# Patient Record
Sex: MALE | Race: WHITE | Employment: UNEMPLOYED | URBAN - METROPOLITAN AREA
[De-identification: names, ages, dates, MRNs, and addresses within clinical notes are randomized per-mention and may not be internally consistent; named-entity substitution may affect disease eponyms.]

---

## 2017-11-14 ENCOUNTER — OFFICE VISIT (OUTPATIENT)
Dept: INTERNAL MEDICINE CLINIC | Facility: CLINIC | Age: 47
End: 2017-11-14

## 2017-11-14 VITALS
HEIGHT: 64 IN | DIASTOLIC BLOOD PRESSURE: 89 MMHG | SYSTOLIC BLOOD PRESSURE: 138 MMHG | TEMPERATURE: 99 F | BODY MASS INDEX: 32.27 KG/M2 | HEART RATE: 90 BPM | WEIGHT: 189 LBS

## 2017-11-14 DIAGNOSIS — Z72.0 TOBACCO ABUSE: ICD-10-CM

## 2017-11-14 DIAGNOSIS — J40 BRONCHITIS: Primary | ICD-10-CM

## 2017-11-14 DIAGNOSIS — H61.22 IMPACTED CERUMEN OF LEFT EAR: ICD-10-CM

## 2017-11-14 PROCEDURE — 99212 OFFICE O/P EST SF 10 MIN: CPT | Performed by: INTERNAL MEDICINE

## 2017-11-14 PROCEDURE — 99213 OFFICE O/P EST LOW 20 MIN: CPT | Performed by: INTERNAL MEDICINE

## 2017-11-14 RX ORDER — LEVOFLOXACIN 500 MG/1
500 TABLET, FILM COATED ORAL DAILY
Qty: 10 TABLET | Refills: 0 | Status: SHIPPED | OUTPATIENT
Start: 2017-11-14 | End: 2017-11-24

## 2017-11-22 NOTE — PROGRESS NOTES
HPI:    Patient ID: Tiffanie Mckeon is a 50year old male.     HPI    Cough congestion    No wheezing  Chronic everyday smoker  No chest pain or fever        /89 (BP Location: Right arm, Patient Position: Sitting, Cuff Size: adult)   Pulse 90   Temp atraumatic. Right Ear: External ear normal.   Left Ear: External ear normal.   Mouth/Throat: Oropharynx is clear and moist. No oropharyngeal exudate. Eyes: Conjunctivae are normal. Right eye exhibits no discharge. Left eye exhibits no discharge.  No scl

## 2019-11-06 ENCOUNTER — APPOINTMENT (OUTPATIENT)
Dept: GENERAL RADIOLOGY | Facility: HOSPITAL | Age: 49
DRG: 291 | End: 2019-11-06
Attending: EMERGENCY MEDICINE
Payer: COMMERCIAL

## 2019-11-06 ENCOUNTER — HOSPITAL ENCOUNTER (OUTPATIENT)
Age: 49
Discharge: OTHER INSTITUTION | End: 2019-11-06
Payer: COMMERCIAL

## 2019-11-06 ENCOUNTER — HOSPITAL ENCOUNTER (INPATIENT)
Facility: HOSPITAL | Age: 49
LOS: 1 days | Discharge: LEFT BEFORE DISCHARGE | DRG: 291 | End: 2019-11-06
Attending: EMERGENCY MEDICINE | Admitting: HOSPITALIST
Payer: COMMERCIAL

## 2019-11-06 VITALS
WEIGHT: 175 LBS | TEMPERATURE: 98 F | RESPIRATION RATE: 18 BRPM | HEART RATE: 95 BPM | BODY MASS INDEX: 29.88 KG/M2 | SYSTOLIC BLOOD PRESSURE: 156 MMHG | HEIGHT: 64 IN | DIASTOLIC BLOOD PRESSURE: 119 MMHG | OXYGEN SATURATION: 95 %

## 2019-11-06 VITALS
DIASTOLIC BLOOD PRESSURE: 127 MMHG | RESPIRATION RATE: 20 BRPM | BODY MASS INDEX: 29.88 KG/M2 | SYSTOLIC BLOOD PRESSURE: 169 MMHG | WEIGHT: 175 LBS | TEMPERATURE: 99 F | OXYGEN SATURATION: 98 % | HEART RATE: 112 BPM | HEIGHT: 64 IN

## 2019-11-06 DIAGNOSIS — I50.9 ACUTE ON CHRONIC CONGESTIVE HEART FAILURE, UNSPECIFIED HEART FAILURE TYPE (HCC): Primary | ICD-10-CM

## 2019-11-06 DIAGNOSIS — N28.9 RENAL INSUFFICIENCY: ICD-10-CM

## 2019-11-06 DIAGNOSIS — R06.00 DYSPNEA, UNSPECIFIED TYPE: Primary | ICD-10-CM

## 2019-11-06 PROCEDURE — 93010 ELECTROCARDIOGRAM REPORT: CPT | Performed by: NURSE PRACTITIONER

## 2019-11-06 PROCEDURE — 93005 ELECTROCARDIOGRAM TRACING: CPT

## 2019-11-06 PROCEDURE — 71046 X-RAY EXAM CHEST 2 VIEWS: CPT | Performed by: EMERGENCY MEDICINE

## 2019-11-06 PROCEDURE — 93010 ELECTROCARDIOGRAM REPORT: CPT

## 2019-11-06 PROCEDURE — 99223 1ST HOSP IP/OBS HIGH 75: CPT | Performed by: HOSPITALIST

## 2019-11-06 PROCEDURE — 99214 OFFICE O/P EST MOD 30 MIN: CPT

## 2019-11-06 RX ORDER — IPRATROPIUM BROMIDE AND ALBUTEROL SULFATE 2.5; .5 MG/3ML; MG/3ML
SOLUTION RESPIRATORY (INHALATION)
Status: DISCONTINUED
Start: 2019-11-06 | End: 2019-11-07

## 2019-11-06 RX ORDER — ASPIRIN 81 MG/1
324 TABLET, CHEWABLE ORAL ONCE
Status: COMPLETED | OUTPATIENT
Start: 2019-11-06 | End: 2019-11-06

## 2019-11-06 RX ORDER — FUROSEMIDE 40 MG/5ML
40 SOLUTION ORAL
Status: CANCELLED | OUTPATIENT
Start: 2019-11-07

## 2019-11-06 RX ORDER — ASPIRIN 81 MG/1
81 TABLET ORAL DAILY
Status: CANCELLED | OUTPATIENT
Start: 2019-11-06

## 2019-11-06 RX ORDER — IPRATROPIUM BROMIDE AND ALBUTEROL SULFATE 2.5; .5 MG/3ML; MG/3ML
3 SOLUTION RESPIRATORY (INHALATION) ONCE
Status: COMPLETED | OUTPATIENT
Start: 2019-11-06 | End: 2019-11-06

## 2019-11-06 RX ORDER — FUROSEMIDE 10 MG/ML
20 INJECTION INTRAMUSCULAR; INTRAVENOUS ONCE
Status: COMPLETED | OUTPATIENT
Start: 2019-11-06 | End: 2019-11-06

## 2019-11-06 RX ORDER — ATORVASTATIN CALCIUM 40 MG/1
40 TABLET, FILM COATED ORAL NIGHTLY
Status: CANCELLED | OUTPATIENT
Start: 2019-11-06

## 2019-11-07 ENCOUNTER — PATIENT OUTREACH (OUTPATIENT)
Dept: CASE MANAGEMENT | Age: 49
End: 2019-11-07

## 2019-11-07 DIAGNOSIS — I50.9 ACUTE ON CHRONIC CONGESTIVE HEART FAILURE, UNSPECIFIED HEART FAILURE TYPE (HCC): ICD-10-CM

## 2019-11-07 DIAGNOSIS — Z02.9 ENCOUNTERS FOR ADMINISTRATIVE PURPOSE: ICD-10-CM

## 2019-11-07 PROCEDURE — 1111F DSCHRG MED/CURRENT MED MERGE: CPT

## 2019-11-07 RX ORDER — FUROSEMIDE 40 MG/1
40 TABLET ORAL 2 TIMES DAILY
COMMUNITY
End: 2019-11-22

## 2019-11-07 RX ORDER — ATORVASTATIN CALCIUM 40 MG/1
40 TABLET, FILM COATED ORAL NIGHTLY
COMMUNITY
End: 2019-12-06

## 2019-11-07 RX ORDER — CARVEDILOL 6.25 MG/1
6.25 TABLET ORAL 2 TIMES DAILY WITH MEALS
COMMUNITY
End: 2019-12-06

## 2019-11-07 RX ORDER — LISINOPRIL AND HYDROCHLOROTHIAZIDE 20; 12.5 MG/1; MG/1
1 TABLET ORAL DAILY
COMMUNITY
End: 2019-11-22

## 2019-11-07 NOTE — PROGRESS NOTES
Initial Post Discharge Follow Up   Discharge Date: 11/6/19  Contact Date: 11/7/2019    Consent Verification:  Assessment Completed With: Patient  HIPAA Verified?   Yes    Discharge Dx:   CHF        General:   • How have you been since your discharge from your CHF diagnosis weighing yourself is very important  How often are you weighing yourself? I'm not  Is there any reason you are unable to weigh yourself daily? No  What was your weight yesterday? Didn't check   Today?  Didn't check  Were you told about a reviewed/discussed/and reconciled against outpatient medications with patient,  and orders reviewed and discussed. Any changes or updates to medications and or orders sent to PCP.

## 2019-11-07 NOTE — ED PROVIDER NOTES
Patient presents with:  Dyspnea CHARLIE SOB (respiratory)      HPI:     This 44year old male presents with a chief complaint of difficulty breathing that started approximately 3 to 4 days ago.   The patient states at night he wakes up out of his sleep gasping Pt will seek care thru [facility] where his Pcp is.   Alcohol use: Yes        Comment: occasionally      Drug use: Not on file      Sexual activity: Not on file    Lifestyle      Physical activity:        Days per week: Not on file        Minutes per session: Not on file      Stress: Not on file    Relation auscultation, no rhonchi or rales. Tachypneic  CARDIO: . No murmur. EXTREMITIES: no cyanosis or edema. NIETO without difficulty  GI: soft, non-tender, normal bowel sounds  NEURO: No deficits.      MDM/Assessment/Plan:   Orders for this encounter:    Or

## 2019-11-07 NOTE — ED INITIAL ASSESSMENT (HPI)
Pt sent from [facility] for productive cough + sob that began last night. Denies CP. EKG done at [facility] with NSR. HTN with no known hx.  States eh had anxiety attack prior to coming here

## 2019-11-07 NOTE — H&P
[address] [facility] Patient Status:  Emergency    1970 MRN [id number]   Location 651 [facility] Attending Desiree Lewis MD   Hosp Day # 0 PCP Fanny Pichardo MD     Date:  1 111  Resp:  [20] 20  BP: (169-174)/(127-134) 174/134    General:  Alert and oriented. Diffuse skin problem:  None. Eye:  Pupils are equal, round and reactive to light, extraocular movements are intact, Normal conjunctiva.   HENT:  Normocephalic, oral muco Assessment and Plan:    Acute congestive heart failure, likely diastolic. Patient started on Lasix 40 mg IV twice daily, monitor I's and O's and daily weights. Check 2D echo. Cardiology has been consulted.     Accelerated hypertension with hypertensi

## 2019-11-07 NOTE — PLAN OF CARE
Pt signed AMA form. IV removed. Nitro patch removed and skin cleansed. Pt verbalizes understanding of AMA form. Pt exited floor ambulatory in stable condition.

## 2019-11-07 NOTE — ED PROVIDER NOTES
Patient Seen in: [facility] AND [facility] Emergency Department    History   Patient presents with:  Dyspnea CHARLIE SOB (respiratory)    Stated Complaint: sob     HPI    45 yo M without PMH presenting for evaluation of three days of cough productive of white sputum a Expiratory wheezing with bibasilar crackles. Abdominal: Soft. Nontender. Musculoskeletal: No gross deformity. BLE without calf tenderness/edema or palpable cord. Neurological: Alert. Skin: Skin is warm. Psychiatric: Cooperative.   Nursing note and vi LAVENDER   RAINBOW DRAW LIGHT GREEN   RAINBOW DRAW GOLD      EKG    Rate, intervals and axes as noted on EKG Report.   Rate: 109   Rhythm: sinus   Reading: sinus tach 109 withotu ischemic change as interpreted by myself          Xr Chest Pa + Lat Chest (cpt with cardiology Dr. Paco Salinas consulted and recs appreciated. CXR with questionable RLL change, PCT nonacute; labs with renal insufficiency of unknown chronicity.       Disposition and Plan     Clinical Impression:  Acute on chronic congestive heart failure, u

## 2019-11-07 NOTE — CONSULTS
CARDIOLOGY CONSULT - [facility]       NAME: Meenu [facility] Street:  - MRN: [id number] - Age: 44year old - :  1970    Date of Admission: 2019  8:05 PM  Admission Diagnosis: Acute on chronic congestive hear (85.7 kg)      No intake or output data in the 24 hours ending 11/06/19 0861    Rhythm: sinus  bpm  Neuro:  Alert and oriented, no focal deficits  Neck:  No bruits  HEENT:  Symmetric, no droop  Mouth: membranes moist  Lungs: scattered basilar crackle

## 2019-11-07 NOTE — ED INITIAL ASSESSMENT (HPI)
PATIENT ARRIVED AMBULATORY TO ROOM C/O SYMPTOMS X2 DAYS. PATIENT STATES \"I'M HAVING TROUBLE BREATHING AND IM COUGHING UP WHITE FOAM\" PATIENT STATES \"I FEEL LIKE I HAVE FLUID IN MY LUNGS\" NO CHEST PAIN. NO FEVERS.

## 2019-11-08 ENCOUNTER — OFFICE VISIT (OUTPATIENT)
Dept: INTERNAL MEDICINE CLINIC | Facility: CLINIC | Age: 49
End: 2019-11-08
Payer: COMMERCIAL

## 2019-11-08 VITALS
OXYGEN SATURATION: 95 % | SYSTOLIC BLOOD PRESSURE: 117 MMHG | BODY MASS INDEX: 31.07 KG/M2 | WEIGHT: 182 LBS | DIASTOLIC BLOOD PRESSURE: 82 MMHG | HEART RATE: 80 BPM | HEIGHT: 64 IN

## 2019-11-08 DIAGNOSIS — N18.30 STAGE 3 CHRONIC KIDNEY DISEASE (HCC): ICD-10-CM

## 2019-11-08 DIAGNOSIS — I10 ESSENTIAL HYPERTENSION: ICD-10-CM

## 2019-11-08 DIAGNOSIS — Z72.0 TOBACCO ABUSE: ICD-10-CM

## 2019-11-08 DIAGNOSIS — I50.9 CONGESTIVE HEART FAILURE, UNSPECIFIED HF CHRONICITY, UNSPECIFIED HEART FAILURE TYPE (HCC): Primary | ICD-10-CM

## 2019-11-08 DIAGNOSIS — F10.10 ALCOHOL ABUSE: ICD-10-CM

## 2019-11-08 PROCEDURE — 99214 OFFICE O/P EST MOD 30 MIN: CPT | Performed by: INTERNAL MEDICINE

## 2019-11-08 PROCEDURE — 1111F DSCHRG MED/CURRENT MED MERGE: CPT | Performed by: INTERNAL MEDICINE

## 2019-11-08 NOTE — PAYOR COMM NOTE
--------------  ADMISSION REVIEW-----LEFT AMA        Payor: Anival SHEPHERD/MARKEL  Subscriber #:  [id number]  Authorization Number: [id number]    Admit date: 11/6/19  Admit time:  2259  Discharge Date: 11/6/2019 11:35 PM     Admitting Physician: Aubrey Benitez All other systems reviewed and negative except as noted above. PSFH elements reviewed from today and agreed except as otherwise stated in HPI.     Physical Exam     ED Triage Vitals   BP 11/06/19 1933 (!) 174/134   Pulse 11/06/19 1933 111   Resp 11/06/19 Neutrophil Absolute Prelim 9.30 (*)     Neutrophil Absolute 9.30 (*)     All other components within normal limits   D-DIMER - Normal   PROCALCITONIN - Normal   PROTHROMBIN TIME (PT) - Normal   PTT, ACTIVATED - Normal   CBC WITH DIFFERENTIAL WITH PLATELET CONCLUSION:   1. Findings most consistent with mild CHF/fluid overload including central pulmonary venous congestion and a suspected small right pleural effusion.  2. Focal opacity in the right lower lobe probably relates to passive atelectasis although pne History & Physical    Bennie Carrion Patient Status:  Emergency    1970 MRN [id number]   Location [address] Attending Jay Russ MD   Hosp Day # 0 PCP Kraig Ramey MD     Date:  2019  Date of Admission: BP: (169-174)/(127-134) 174/134    General:  Alert and oriented. Diffuse skin problem:  None. Eye:  Pupils are equal, round and reactive to light, extraocular movements are intact, Normal conjunctiva.   HENT:  Normocephalic, oral mucosa is moist.  Head: Acute congestive heart failure, likely diastolic. Patient started on Lasix 40 mg IV twice daily, monitor I's and O's and daily weights. Check 2D echo. Cardiology has been consulted.     Accelerated hypertension with hypertensive heart disease  Blood pres Electronically signed by Edmundo Cabot, MD on 11/6/2019 11:59 PM         Consults signed by Beth Villagomez MD at 11/6/2019 10:50 PM     Author: Beth Villagomez MD Service: Ramya Tan Author Type: Physician   Filed: 11/6/2019 10:50 PM Date of Service: 11/6/2019 1 1933 11/06/19 2115 11/06/19  2145 11/06/19  2230   BP: (!) 174/134 (!) 159/128 (!) 156/105 (!) 156/108   Pulse: 111 104 97 96   Resp: 20 18 16 18   Temp: 97.9 °F (36.6 °C)         SpO2: 100% 96% 97% 95%   Weight: 175 lb (79.4 kg)         Height: 162.6 cm

## 2019-11-08 NOTE — PROGRESS NOTES
HPI:    Patient ID: Chioma Michael is a 44year old male.     HPI     Here for follow up  Was admitted wt11/6 discharged 11/7/19  Against medical advised  Did not complete cardiac work up   Petros Munguia better  Was short of breath   CHF   Naveen williamson 18   Temp: 97.9 °F (36.6 °C)         SpO2: 100% 96% 97% 95%   Weight: 175 lb (79.4 kg)         Height: 162.6 cm (5' 4\")                  Oxygen Therapy  SpO2: 95 %  O2 Device: None (Room air)                       Wt Readings from Last 3 Encounters:  11/0 kg)  11/06/19 : 175 lb (79.4 kg)  11/14/17 : 189 lb (85.7 kg)  12/21/16 : 190 lb 3.2 oz (86.3 kg)  09/22/14 : 179 lb 8 oz (81.4 kg)    Body mass index is 31.24 kg/m².   HGBA1C:  No results found for: A1C, EAG      Review of Systems   Constitutional: Negativ Current User    Alcohol use: Yes      Comment: occasionally    Drug use: Not on file       PHYSICAL EXAM:    Physical Exam   Constitutional: He appears well-nourished. No distress. HENT:   Head: Normocephalic and atraumatic.    Right Ear: External ear nor 0.20 x10(3) uL 0.09   Immature Granulocyte Absolute      0.00 - 1.00 x10(3) uL 0.04   Neutrophils %      % 64.8   Lymphocytes %      % 26.5   Monocytes %      % 5.8   Eosinophils %      % 2.0   Basophils %      % 0.6   Immature Granulocyte %      % 0.3   G hypertension  Plan: /82 (BP Location: Right arm, Patient Position: Sitting, Cuff Size: large)   Pulse 80   Ht 5' 4\" (1.626 m)   Wt 182 lb (82.6 kg)   SpO2 95%   BMI 31.24 kg/m²   controlled    (F10.10) Alcohol abuse  Plan: alcohol rehab  Abstinence

## 2019-11-18 ENCOUNTER — TELEPHONE (OUTPATIENT)
Dept: CARDIOLOGY CLINIC | Facility: CLINIC | Age: 49
End: 2019-11-18

## 2019-11-18 ENCOUNTER — HOSPITAL ENCOUNTER (OUTPATIENT)
Dept: CV DIAGNOSTICS | Age: 49
Discharge: HOME | End: 2019-11-18
Attending: INTERNAL MEDICINE
Payer: COMMERCIAL

## 2019-11-18 DIAGNOSIS — I50.9 CONGESTIVE HEART FAILURE, UNSPECIFIED HF CHRONICITY, UNSPECIFIED HEART FAILURE TYPE (HCC): ICD-10-CM

## 2019-11-18 PROCEDURE — 93306 TTE W/DOPPLER COMPLETE: CPT | Performed by: INTERNAL MEDICINE

## 2019-11-18 NOTE — TELEPHONE ENCOUNTER
Pt here for echocardiogram. BP upon evaluation is 78/60, verified in both arms. Pt slightly dizzy at times.   Had pt drink water ,

## 2019-11-18 NOTE — TELEPHONE ENCOUNTER
Pt drank large glass of water, no c/o dizziness or lightheadedness  now. BP 86/60. Instructed to hold medication till talks with Dr. Lux Carranza.  To eat breakfast and drink more water this am. To call PCP office if doesn't hear from them by tomorrow am.

## 2019-11-18 NOTE — TELEPHONE ENCOUNTER
Patient has office visit with Dr. Ariana Romero 11/22. Patient informed by RN in cardiology to hold all of his medications except for his statin until further advice from Dr. Ariana Romero. Please advise.

## 2019-11-18 NOTE — TELEPHONE ENCOUNTER
I agree  Hold off all meds    See me this week if he does not have an appt with cardiology add to SDS

## 2019-11-22 ENCOUNTER — OFFICE VISIT (OUTPATIENT)
Dept: INTERNAL MEDICINE CLINIC | Facility: CLINIC | Age: 49
End: 2019-11-22
Payer: COMMERCIAL

## 2019-11-22 ENCOUNTER — APPOINTMENT (OUTPATIENT)
Dept: LAB | Age: 49
End: 2019-11-22
Attending: INTERNAL MEDICINE
Payer: COMMERCIAL

## 2019-11-22 VITALS
SYSTOLIC BLOOD PRESSURE: 86 MMHG | HEIGHT: 64 IN | BODY MASS INDEX: 29.71 KG/M2 | WEIGHT: 174 LBS | DIASTOLIC BLOOD PRESSURE: 54 MMHG | HEART RATE: 76 BPM

## 2019-11-22 DIAGNOSIS — I50.9 CONGESTIVE HEART FAILURE, UNSPECIFIED HF CHRONICITY, UNSPECIFIED HEART FAILURE TYPE (HCC): ICD-10-CM

## 2019-11-22 DIAGNOSIS — I10 ESSENTIAL HYPERTENSION: Primary | ICD-10-CM

## 2019-11-22 DIAGNOSIS — R73.01 ABNORMAL FASTING GLUCOSE: ICD-10-CM

## 2019-11-22 DIAGNOSIS — F10.10 ALCOHOL ABUSE: ICD-10-CM

## 2019-11-22 DIAGNOSIS — E78.5 HYPERLIPIDEMIA, UNSPECIFIED HYPERLIPIDEMIA TYPE: ICD-10-CM

## 2019-11-22 DIAGNOSIS — Z72.0 TOBACCO ABUSE: ICD-10-CM

## 2019-11-22 DIAGNOSIS — N18.30 STAGE 3 CHRONIC KIDNEY DISEASE (HCC): ICD-10-CM

## 2019-11-22 PROCEDURE — 80061 LIPID PANEL: CPT

## 2019-11-22 PROCEDURE — 99214 OFFICE O/P EST MOD 30 MIN: CPT | Performed by: INTERNAL MEDICINE

## 2019-11-22 PROCEDURE — 84439 ASSAY OF FREE THYROXINE: CPT

## 2019-11-22 PROCEDURE — 83036 HEMOGLOBIN GLYCOSYLATED A1C: CPT

## 2019-11-22 PROCEDURE — 85027 COMPLETE CBC AUTOMATED: CPT

## 2019-11-22 PROCEDURE — 84443 ASSAY THYROID STIM HORMONE: CPT

## 2019-11-22 PROCEDURE — 80053 COMPREHEN METABOLIC PANEL: CPT

## 2019-11-22 PROCEDURE — 83880 ASSAY OF NATRIURETIC PEPTIDE: CPT

## 2019-11-22 PROCEDURE — 36415 COLL VENOUS BLD VENIPUNCTURE: CPT

## 2019-11-22 RX ORDER — LISINOPRIL 20 MG/1
20 TABLET ORAL DAILY
Qty: 90 TABLET | Refills: 1 | Status: ON HOLD | OUTPATIENT
Start: 2019-11-22 | End: 2019-11-24

## 2019-11-22 RX ORDER — FUROSEMIDE 20 MG/1
20 TABLET ORAL DAILY
Qty: 90 TABLET | Refills: 1 | Status: ON HOLD | OUTPATIENT
Start: 2019-11-22 | End: 2019-11-24

## 2019-11-22 NOTE — PROGRESS NOTES
HPI:    Patient ID: Olivia Leija is a 44year old male.     HPI    Pt here for follow up  44year old with hx of alcohol abuse  Stopped drinking alcohol 3 wks ago and tobacco abuse  Smoking much less cigaretted  He was admitted in the hospital with CHF Refill   • furosemide 20 MG Oral Tab Take 1 tablet (20 mg total) by mouth daily. 90 tablet 1   • lisinopril 20 MG Oral Tab Take 1 tablet (20 mg total) by mouth daily. 90 tablet 1   • ASPIRIN 81 OR Take by mouth.      • atorvastatin 40 MG Oral Tab Take 40 mg Study Conclusions  1. Left ventricle: The cavity size was dilated. Systolic function     was normal. The estimated ejection fraction was 50-55%. Wall     motion was normal; there were no regional wall motion     abnormalities.  Doppler parameters are cons 11/22/2019 11/22/2019          11:05 AM 11:05 AM   Glucose      70 - 99 mg/dL 105 (H)    Sodium      136 - 145 mmol/L 132 (L)    Potassium      3.5 - 5.1 mmol/L 5.1    Chloride      98 - 112 mmol/L 97 (L)    Carbon Dioxide, Total      21.0 - 32.0 mmol/L 30 CBC, Platelet;  No Differential      Comp Metabolic Panel (14)      Hemoglobin A1C      Urine Microscopic w Reflex CULTURE      Meds This Visit:  Requested Prescriptions     Signed Prescriptions Disp Refills   • furosemide 20 MG Oral Tab 90 tablet 1     S

## 2019-11-23 ENCOUNTER — APPOINTMENT (OUTPATIENT)
Dept: ULTRASOUND IMAGING | Facility: HOSPITAL | Age: 49
DRG: 683 | End: 2019-11-23
Attending: EMERGENCY MEDICINE
Payer: COMMERCIAL

## 2019-11-23 ENCOUNTER — HOSPITAL ENCOUNTER (INPATIENT)
Facility: HOSPITAL | Age: 49
LOS: 1 days | Discharge: HOME | DRG: 683 | End: 2019-11-24
Attending: EMERGENCY MEDICINE | Admitting: HOSPITALIST
Payer: COMMERCIAL

## 2019-11-23 ENCOUNTER — TELEPHONE (OUTPATIENT)
Dept: INTERNAL MEDICINE CLINIC | Facility: CLINIC | Age: 49
End: 2019-11-23

## 2019-11-23 ENCOUNTER — APPOINTMENT (OUTPATIENT)
Dept: CV DIAGNOSTICS | Facility: HOSPITAL | Age: 49
DRG: 683 | End: 2019-11-23
Attending: INTERNAL MEDICINE
Payer: COMMERCIAL

## 2019-11-23 DIAGNOSIS — N17.9 AKI (ACUTE KIDNEY INJURY) (HCC): Primary | ICD-10-CM

## 2019-11-23 PROCEDURE — 99223 1ST HOSP IP/OBS HIGH 75: CPT | Performed by: HOSPITALIST

## 2019-11-23 PROCEDURE — 99255 IP/OBS CONSLTJ NEW/EST HI 80: CPT | Performed by: INTERNAL MEDICINE

## 2019-11-23 PROCEDURE — 76770 US EXAM ABDO BACK WALL COMP: CPT | Performed by: EMERGENCY MEDICINE

## 2019-11-23 RX ORDER — HEPARIN SODIUM 5000 [USP'U]/ML
5000 INJECTION, SOLUTION INTRAVENOUS; SUBCUTANEOUS EVERY 12 HOURS SCHEDULED
Status: DISCONTINUED | OUTPATIENT
Start: 2019-11-23 | End: 2019-11-24

## 2019-11-23 RX ORDER — ZOLPIDEM TARTRATE 5 MG/1
5 TABLET ORAL NIGHTLY PRN
Status: DISCONTINUED | OUTPATIENT
Start: 2019-11-23 | End: 2019-11-24

## 2019-11-23 RX ORDER — ACETAMINOPHEN 325 MG/1
650 TABLET ORAL EVERY 6 HOURS PRN
Status: DISCONTINUED | OUTPATIENT
Start: 2019-11-23 | End: 2019-11-24

## 2019-11-23 RX ORDER — SODIUM CHLORIDE 9 MG/ML
INJECTION, SOLUTION INTRAVENOUS CONTINUOUS
Status: DISCONTINUED | OUTPATIENT
Start: 2019-11-23 | End: 2019-11-24

## 2019-11-23 RX ORDER — ATORVASTATIN CALCIUM 40 MG/1
40 TABLET, FILM COATED ORAL NIGHTLY
Status: DISCONTINUED | OUTPATIENT
Start: 2019-11-23 | End: 2019-11-23

## 2019-11-23 RX ORDER — HYDRALAZINE HYDROCHLORIDE 20 MG/ML
10 INJECTION INTRAMUSCULAR; INTRAVENOUS EVERY 4 HOURS PRN
Status: DISCONTINUED | OUTPATIENT
Start: 2019-11-23 | End: 2019-11-24

## 2019-11-23 RX ORDER — CARVEDILOL 6.25 MG/1
6.25 TABLET ORAL 2 TIMES DAILY WITH MEALS
Status: DISCONTINUED | OUTPATIENT
Start: 2019-11-23 | End: 2019-11-24

## 2019-11-23 RX ORDER — FUROSEMIDE 40 MG/5ML
40 SOLUTION ORAL
Status: DISCONTINUED | OUTPATIENT
Start: 2019-11-23 | End: 2019-11-23

## 2019-11-23 RX ORDER — ATORVASTATIN CALCIUM 40 MG/1
40 TABLET, FILM COATED ORAL NIGHTLY
Status: DISCONTINUED | OUTPATIENT
Start: 2019-11-23 | End: 2019-11-24

## 2019-11-23 RX ORDER — ONDANSETRON 2 MG/ML
4 INJECTION INTRAMUSCULAR; INTRAVENOUS EVERY 6 HOURS PRN
Status: DISCONTINUED | OUTPATIENT
Start: 2019-11-23 | End: 2019-11-24

## 2019-11-23 RX ORDER — ASPIRIN 81 MG/1
81 TABLET ORAL DAILY
Status: DISCONTINUED | OUTPATIENT
Start: 2019-11-23 | End: 2019-11-24

## 2019-11-23 RX ORDER — FUROSEMIDE 40 MG/1
40 TABLET ORAL
Status: DISCONTINUED | OUTPATIENT
Start: 2019-11-23 | End: 2019-11-23

## 2019-11-23 RX ORDER — ASPIRIN 81 MG/1
81 TABLET ORAL DAILY
Status: DISCONTINUED | OUTPATIENT
Start: 2019-11-23 | End: 2019-11-23

## 2019-11-23 NOTE — CONSULTS
[facility]    PATIENT'S NAME: Seth Montejo   ATTENDING PHYSICIAN: Mickey Kennedy MD   CONSULTING PHYSICIAN: Radha Brasher MD   PATIENT ACCOUNT#:   [id number]    LOCATION:  [address] #:   [id number]       DATE OF B time of admission showed that he was on furosemide 20 mg daily, lisinopril 20 mg daily, aspirin 81 mg daily, Lipitor 40 mg at bedtime, and Coreg 6.25 mg b.i.d. ALLERGIES:  There are no known allergies.     SOCIAL HISTORY:  The patient has smoked a pack hydration. Will follow with I and O's, daily weights, and serial chemistries. Strongly advised the patient to stay here in the hospital until he is medically stable. Reinforced the importance of avoiding all use of nonsteroidals.   He will continue work

## 2019-11-23 NOTE — ED INITIAL ASSESSMENT (HPI)
Pt sent by MD for acute renal failure. Recently was admitted for acute CHF. Pt states he has been having difficulty urinating. Denies CHARLIE, no swelling noted.

## 2019-11-23 NOTE — ED NOTES
Orders for admission, patient is aware of plan and ready to go upstairs.  Any questions, please call ED RN Henny Slade  at extension [phone]

## 2019-11-23 NOTE — PLAN OF CARE
Patient had Echo done on 11/18/19. Another Echo ordered today. Checked with Froy ESTRADA. No Echo needed at this time.

## 2019-11-23 NOTE — H&P
[facility] Patient Status:  Emergency    1970 MRN [id number]   Location 651 [facility] Attending Sejal Frederick MD   Hosp Day # 0 PCP Chris Herr MD     Date:  6 Facility-Administered Medications: None       Review of Systems:  Constitutional:  Weakness, Fatigue. Eye:  Negative. Ear/Nose/Mouth/Throat:  Negative. Respiratory:  Negative  Cardiovascular: Negative  Gastrointestinal:  Negative.   Genitourinary:  Neg Plan:    Acute kidney injury  Patient recently started on diuretics along with ACE inhibitor, likely prerenal with possible component of ATN, FENa and renal ultrasound pending, patient started on gentle hydration will continue the same.   Avoid nephrotoxic

## 2019-11-23 NOTE — PLAN OF CARE
Problem: CARDIOVASCULAR - ADULT  Goal: Maintains optimal cardiac output and hemodynamic stability  Description  INTERVENTIONS:  - Monitor vital signs, rhythm, and trends  - Monitor for bleeding, hypotension and signs of decreased cardiac output  - Evalua Plan goals for specific interventions  Outcome: Progressing     Admitted pt to floor. Aox4. Started IVF.  VSS. No issues with assessment. Pt states he just wants to sleep. Educated on [address] [facility] [address].

## 2019-11-23 NOTE — ED PROVIDER NOTES
Patient Seen in: Merged with [facility] Emergency Department      History   Patient presents with:  Abnormal Result (metabolic, cardiac)    Stated Complaint: Renal failure, sent in by Dr. Darrick Sher    HPI    61-year-old male with history of CHF here with compla systems reviewed and are negative. Positive for stated complaint: Renal failure, sent in by Dr. Israel Cowan  Other systems are as noted in HPI. Constitutional and vital signs reviewed. All other systems reviewed and negative except as noted above. oxygenation.     PROCEDURES:  none    DIAGNOSTICS:   Labs:  Recent Results (from the past 24 hour(s))   PRO BETA NATRIURETIC PEPTIDE    Collection Time: 11/22/19 11:05 AM   Result Value Ref Range    Pro-Beta Natriuretic Peptide 476 (H) <125 pg/mL   ASSAY, T g/dL    Globulin  4.6 (H) 2.8 - 4.4 g/dL    A/G Ratio 0.8 (L) 1.0 - 2.0    FASTING Yes    HEMOGLOBIN A1C    Collection Time: 11/22/19 11:05 AM   Result Value Ref Range    HgbA1C 5.6 <5.7 %    Estimated Average Glucose 114 68 - 126 mg/dL   BASIC METABOLIC P vitals  - afebrile, hemodynamically stable  - I ordered and personally reviewed the labs and imaging and found leukocytosis, no anemia, hyponatremia, JANKI,   - post void residual without significant urinary retention  - discussed with Dr. Jahaira Mosley - informed hi 2:18 am    Follow-up:  No follow-up provider specified.       Medications Prescribed:  Current Discharge Medication List                   Present on Admission  Date Reviewed: 11/23/2019          ICD-10-CM Noted POA    [facility]ANKI [facility]) ([facility] N17.9

## 2019-11-24 ENCOUNTER — APPOINTMENT (OUTPATIENT)
Dept: GENERAL RADIOLOGY | Facility: HOSPITAL | Age: 49
DRG: 683 | End: 2019-11-24
Attending: INTERNAL MEDICINE
Payer: COMMERCIAL

## 2019-11-24 ENCOUNTER — TELEPHONE (OUTPATIENT)
Dept: NEPHROLOGY | Facility: CLINIC | Age: 49
End: 2019-11-24

## 2019-11-24 VITALS
OXYGEN SATURATION: 97 % | WEIGHT: 176.38 LBS | DIASTOLIC BLOOD PRESSURE: 76 MMHG | SYSTOLIC BLOOD PRESSURE: 122 MMHG | RESPIRATION RATE: 18 BRPM | HEART RATE: 69 BPM | BODY MASS INDEX: 30 KG/M2 | TEMPERATURE: 98 F

## 2019-11-24 PROCEDURE — 99239 HOSP IP/OBS DSCHRG MGMT >30: CPT | Performed by: HOSPITALIST

## 2019-11-24 PROCEDURE — 99233 SBSQ HOSP IP/OBS HIGH 50: CPT | Performed by: INTERNAL MEDICINE

## 2019-11-24 PROCEDURE — 71045 X-RAY EXAM CHEST 1 VIEW: CPT | Performed by: INTERNAL MEDICINE

## 2019-11-24 NOTE — DISCHARGE SUMMARY
[facility]ist Discharge Summary   Patient ID:  Scar Rosenberg  [id number]  8 year old  7/9/1970    Admit date: 11/23/2019  Discharge date: 11/24/2019  Primary Care Physician: Patricia Ferreira MD   Attending Physician: Ritesh Bloom MD   [facility] statin     Tobacco abuse  - Patient counseled encouraged to quit.     EXAM:   GENERAL: no apparent distress, comfortable  NEURO: A/A Ox3, no focal deficits  RESP: non labored, CTAB/L  CARDIO: Regular, no murmur  ABD: soft, NT, ND  EXTREMITIES: no edema, no

## 2019-11-24 NOTE — PLAN OF CARE
Problem: CARDIOVASCULAR - ADULT  Goal: Maintains optimal cardiac output and hemodynamic stability  Description  INTERVENTIONS:  - Monitor vital signs, rhythm, and trends  - Monitor for bleeding, hypotension and signs of decreased cardiac output  - Evalua Put sign on door to notify RN before entering. Explained POC for night. Bundled care. - See additional Care Plan goals for specific interventions  Outcome: Progressing     Pt AOx4. Self care. VSS. IVF running.   Plan for CXR in am.  Deven patch on R arm

## 2019-11-24 NOTE — PROGRESS NOTES
[facility]  Nephrology Daily Progress Note    Uzma Hoyt  [id number]  44year old      HPI:   Uzma Hoyt is a 44year old male. Did not sleep well last night but otherwise feeling well. . No SOB.        ROS:     Constitutional: motor skills appropriate for age    Labs:  Lab Results   Component Value Date    WBC 14.7 11/24/2019    HGB 14.7 11/24/2019    HCT 42.7 11/24/2019    .0 11/24/2019    CREATSERUM 2.97 11/24/2019    BUN 60 11/24/2019     11/24/2019    K 4.4 11/2 Units, 5,000 Units, Subcutaneous, 2 times per day  •  hydrALAzine HCl (APRESOLINE) injection 10 mg, 10 mg, Intravenous, Q4H PRN  •  Zolpidem Tartrate (AMBIEN) tab 5 mg, 5 mg, Oral, Nightly PRN  •  nicotine (NICODERM CQ) 7 MG/24HR 1 patch, 1 patch, Transder

## 2019-11-25 ENCOUNTER — PATIENT OUTREACH (OUTPATIENT)
Dept: CASE MANAGEMENT | Age: 49
End: 2019-11-25

## 2019-11-25 DIAGNOSIS — Z02.9 ENCOUNTERS FOR ADMINISTRATIVE PURPOSE: ICD-10-CM

## 2019-11-25 NOTE — PROGRESS NOTES
NCM attempted to contact the patient for hospital follow up. Unable to leave message due to the mailbox being full. NCM will try again later.

## 2019-11-25 NOTE — PAYOR COMM NOTE
--------------  ADMISSION REVIEW     Payor: Donna SHEPHERD/MARKEL  Subscriber #:  [id number]  Authorization Number: [id number]    Admit date: 11/23/19  Admit time: 0717  DISCHARGE 11/24         Admitting Physician: Ping Cronin MD  Attending Physician:  Ariadne Hernandez Types: Cigarettes      Smokeless tobacco: Current User    Alcohol use: Yes      Comment: occasionally    Drug use: Not on file             Review of Systems   Constitutional: Negative for appetite change, fatigue and fever.    HENT: Negative for congestion, Breath sounds: Normal breath sounds. No stridor. No wheezing or rales. Abdominal:      General: There is no distension. Palpations: Abdomen is soft. Tenderness: There is no tenderness. There is no guarding.    Musculoskeletal: Normal range Collection Time: 11/22/19 11:05 AM   Result Value Ref Range    Glucose 105 (H) 70 - 99 mg/dL    Sodium 132 (L) 136 - 145 mmol/L    Potassium 5.1 3.5 - 5.1 mmol/L    Chloride 97 (L) 98 - 112 mmol/L    CO2 30.0 21.0 - 32.0 mmol/L    Anion Gap 5 0 - 18 mmol/L (3) uL    Neutrophil Absolute 9.61 (H) 1.50 - 7.70 x10(3) uL    Lymphocyte Absolute 2.71 1.00 - 4.00 x10(3) uL    Monocyte Absolute 1.09 (H) 0.10 - 1.00 x10(3) uL    Eosinophil Absolute 0.11 0.00 - 0.70 x10(3) uL    Basophil Absolute 0.05 0.00 - 0.20 x10(3 DEPARTMENT MEDICAL DECISION MAKING:  After obtaining the patient's history, performing the physical exam and reviewing the diagnostics, multiple initial diagnoses were considered based on the presenting problem including JANKI, urinary retention, dehydration which she had been taking regularly along with limiting his fluid intake was seen by his primary care physician as a follow-up checkup when blood work done indicated marked worsening of renal function from a baseline creatinine of 1.7 to now 5.2.   Patient oriented. Diffuse skin problem:  None. Eye:  Pupils are equal, round and reactive to light, extraocular movements are intact, Normal conjunctiva. HENT:  Normocephalic, oral mucosa is moist.  Head:  Normocephalic, atraumatic.   Neck:  Supple, non-tender, statin    Tobacco abuse  Patient counseled encouraged to quit.     Prophylaxis  Subcutaneous heparin    CODE STATUS  Full    Primary care physician  Sarai Oliver MD    Disposition  Clinical course will dictate outcome      Tito Lindsay MD  11/23/2019 creatinine were 89 and 5.09 respectively, and the patient is now admitted for further workup and evaluation.      PAST MEDICAL HISTORY:  Again, the patient's past medical history prior to this brief hospitalization in early November 2019 was unremarkable. unremarkable.      IMPRESSION:    1. The patient is a 57-year-old male who may actually have underlying chronic kidney disease stage III. We do not have any older laboratory studies, but his creatinine was 1.7 back on November 6, 2019.   He has now d with a past medical history significant for recently diagnosed congestive heart failure was started on diuretics and ACE inhibitor which she had been taking regularly along with limiting his fluid intake was seen by his primary care physician as a follow-u 11/24/2019 at 7:45     Approved by (CST): Edwige Rachel MD on 11/24/2019 at 7:46              Discharge Instructions      Medication List       CONTINUE taking these medications    ASPIRIN 81 OR      atorvastatin 40 MG Tabs  Commonly known as:  LIPITOR

## 2019-11-26 ENCOUNTER — TELEPHONE (OUTPATIENT)
Dept: NEPHROLOGY | Facility: CLINIC | Age: 49
End: 2019-11-26

## 2019-11-26 DIAGNOSIS — N28.9 RENAL INSUFFICIENCY: Primary | ICD-10-CM

## 2019-11-26 NOTE — DIETARY NOTE
NUTRITION EDUCATION NOTE    Received RN request to call patient at home. RN provided patient with renal nutrition guidelines upon discharge on 11/24/19.   This RD made 2 attempts to call patient, on 11/25/19 at 1159 am and today at 959 am.  Call went to vo

## 2019-11-26 NOTE — TELEPHONE ENCOUNTER
Orders entered in [address] [place] [address] Rd as [address] Appointment has been scheduled for Friday 12/6/19 4:20 pm (same day as Dr. Pedroza Nurse appt 2:40 pm) Patient requested this date/time.

## 2019-11-26 NOTE — PROGRESS NOTES
NCM attempted to contact the patient for hospital follow up. Unable to leave a message due to the MB being full. STEPHANIE will try again later.

## 2019-12-06 ENCOUNTER — OFFICE VISIT (OUTPATIENT)
Dept: INTERNAL MEDICINE CLINIC | Facility: CLINIC | Age: 49
End: 2019-12-06
Payer: COMMERCIAL

## 2019-12-06 ENCOUNTER — OFFICE VISIT (OUTPATIENT)
Dept: NEPHROLOGY | Facility: CLINIC | Age: 49
End: 2019-12-06
Payer: COMMERCIAL

## 2019-12-06 ENCOUNTER — LAB ENCOUNTER (OUTPATIENT)
Dept: LAB | Age: 49
End: 2019-12-06
Attending: INTERNAL MEDICINE
Payer: COMMERCIAL

## 2019-12-06 VITALS
BODY MASS INDEX: 30.05 KG/M2 | TEMPERATURE: 98 F | HEIGHT: 64 IN | SYSTOLIC BLOOD PRESSURE: 152 MMHG | DIASTOLIC BLOOD PRESSURE: 91 MMHG | WEIGHT: 176 LBS | HEART RATE: 80 BPM

## 2019-12-06 VITALS
DIASTOLIC BLOOD PRESSURE: 87 MMHG | BODY MASS INDEX: 30.22 KG/M2 | WEIGHT: 177 LBS | HEIGHT: 64 IN | SYSTOLIC BLOOD PRESSURE: 137 MMHG | HEART RATE: 85 BPM

## 2019-12-06 DIAGNOSIS — I50.9 CONGESTIVE HEART FAILURE, UNSPECIFIED HF CHRONICITY, UNSPECIFIED HEART FAILURE TYPE (HCC): ICD-10-CM

## 2019-12-06 DIAGNOSIS — I10 ESSENTIAL HYPERTENSION: Primary | ICD-10-CM

## 2019-12-06 DIAGNOSIS — E78.5 HYPERLIPIDEMIA, UNSPECIFIED HYPERLIPIDEMIA TYPE: ICD-10-CM

## 2019-12-06 DIAGNOSIS — N17.9 AKI (ACUTE KIDNEY INJURY) (HCC): ICD-10-CM

## 2019-12-06 DIAGNOSIS — N17.9 AKI (ACUTE KIDNEY INJURY) (HCC): Primary | ICD-10-CM

## 2019-12-06 DIAGNOSIS — Z72.0 TOBACCO ABUSE: ICD-10-CM

## 2019-12-06 DIAGNOSIS — R73.01 ABNORMAL FASTING GLUCOSE: ICD-10-CM

## 2019-12-06 DIAGNOSIS — F10.10 ALCOHOL ABUSE: ICD-10-CM

## 2019-12-06 DIAGNOSIS — N28.9 RENAL INSUFFICIENCY: ICD-10-CM

## 2019-12-06 PROCEDURE — 99214 OFFICE O/P EST MOD 30 MIN: CPT | Performed by: INTERNAL MEDICINE

## 2019-12-06 PROCEDURE — 85025 COMPLETE CBC W/AUTO DIFF WBC: CPT

## 2019-12-06 PROCEDURE — 80069 RENAL FUNCTION PANEL: CPT

## 2019-12-06 PROCEDURE — 36415 COLL VENOUS BLD VENIPUNCTURE: CPT

## 2019-12-06 PROCEDURE — 1111F DSCHRG MED/CURRENT MED MERGE: CPT | Performed by: INTERNAL MEDICINE

## 2019-12-06 RX ORDER — ATORVASTATIN CALCIUM 40 MG/1
40 TABLET, FILM COATED ORAL NIGHTLY
Qty: 90 TABLET | Refills: 3 | Status: SHIPPED | OUTPATIENT
Start: 2019-12-06 | End: 2021-01-11

## 2019-12-06 RX ORDER — ASPIRIN 81 MG/1
81 TABLET ORAL DAILY
Qty: 90 TABLET | Refills: 3 | Status: SHIPPED | OUTPATIENT
Start: 2019-12-06

## 2019-12-06 RX ORDER — CARVEDILOL 6.25 MG/1
6.25 TABLET ORAL 2 TIMES DAILY WITH MEALS
Qty: 180 TABLET | Refills: 3 | Status: SHIPPED | OUTPATIENT
Start: 2019-12-06 | End: 2019-12-06

## 2019-12-06 RX ORDER — CARVEDILOL 12.5 MG/1
6.25 TABLET ORAL 2 TIMES DAILY WITH MEALS
Qty: 180 TABLET | Refills: 1 | Status: SHIPPED | OUTPATIENT
Start: 2019-12-06 | End: 2020-04-20

## 2019-12-06 NOTE — PATIENT INSTRUCTIONS
Increase Frankford Cherelle to 12.5 mg twice daily. When you get your blood pressure cuff check your blood pressures and heart rates daily and call me in 1 week. Repeat your kidney blood test in early January 2020. Orders are in the computer.

## 2019-12-06 NOTE — PROGRESS NOTES
12/06/19        Patient: Luis Armenta   YOB: 1970   Date of Visit: 12/6/2019       Dear  Dr. Treasure Strong MD,      Thank you for referring Luis Armenta to my practice. Please find my assessment and plan below.       As you know he is a no edema. I reviewed his most recent laboratory studies which were done today on December 6, 2019. Creatinine continues to improve down to 1.95 with an estimated GFR 39 cc/min.   Electrolytes were normal.    I therefore reassured the patient that his re

## 2019-12-09 NOTE — PROGRESS NOTES
Several attempts made to reach the patient with no return call. Patient completed HFU on 12/6/2019. Closing encounter.

## 2019-12-16 NOTE — PROGRESS NOTES
HPI:    Elisa Julian is a 44year old male here today for hospital follow up.    He was discharged from Inpatient hospital, [facility]  to Home   Admission Date: 11/23/19   Discharge Date: 11/24/19  Hospital Discharge Diagnoses (since 11/16/2019 OR      atorvastatin 40 MG Tabs  Commonly known as:  LIPITOR      carvedilol 6.25 MG Tabs  Commonly known as:  COREG          STOP taking these medications    furosemide 20 MG Tabs  Commonly known as:  LASIX      lisinopril 20 MG Tabs                Activi (congestive heart failure) ([facility] [address] He  has no past surgical history on file. He family history includes Hypertension in his mother. He  reports that he has been smoking cigarettes. He has been smoking about 0.50 packs per day.  He uses smokeless toba to light. Conjunctivae and EOM are normal. Right eye exhibits no discharge. Left eye exhibits no discharge. No scleral icterus. Neck: Neck supple. No thyromegaly present. Cardiovascular: Regular rhythm, normal heart sounds and intact distal pulses. discussed  Modification of risk for CAD advised    Dietary an lifestyle change  Pt voiced understanding and agrees with plan  Pt given time to ask questions  After Visit Summary handout    Discussed  And given to patient.         Meds & Refills for this [facility]

## 2019-12-26 ENCOUNTER — OFFICE VISIT (OUTPATIENT)
Dept: CARDIOLOGY CLINIC | Facility: CLINIC | Age: 49
End: 2019-12-26
Payer: COMMERCIAL

## 2019-12-26 VITALS
WEIGHT: 175.5 LBS | RESPIRATION RATE: 18 BRPM | BODY MASS INDEX: 29.96 KG/M2 | TEMPERATURE: 99 F | HEART RATE: 67 BPM | HEIGHT: 64 IN | DIASTOLIC BLOOD PRESSURE: 88 MMHG | SYSTOLIC BLOOD PRESSURE: 146 MMHG

## 2019-12-26 DIAGNOSIS — I50.32 CHRONIC DIASTOLIC HEART FAILURE (HCC): Primary | ICD-10-CM

## 2019-12-26 DIAGNOSIS — E78.49 OTHER HYPERLIPIDEMIA: ICD-10-CM

## 2019-12-26 DIAGNOSIS — I10 ESSENTIAL HYPERTENSION: ICD-10-CM

## 2019-12-26 PROCEDURE — 99215 OFFICE O/P EST HI 40 MIN: CPT | Performed by: INTERNAL MEDICINE

## 2019-12-26 NOTE — PATIENT INSTRUCTIONS
Increase carvedilol to 18.75 mg twice a day. May use 1-1/2 of the 12.5 mg tablets twice a day. Monitor and record resting blood pressure and pulse after 5 minutes of rest for 2 weeks and call with results.   When check blood pressure check 3 readings an

## 2019-12-26 NOTE — PROGRESS NOTES
[address] [place] [address] NOTE    Robby Gowers is a 44year old male. Patient presents with:  Consult: Pt present for CHF and HTN. Pt states he has been well.      HPI:   This is a pleasant 44year old male with hypertension tobacco use and elevated cho Types: Cigarettes      Smokeless tobacco: Current User    Alcohol use: Yes      Comment: occasionally    Drug use: Never    Family History  Family History   Problem Relation Age of Onset   • Hypertension Mother         REVIEW OF SYSTEMS:   GENERAL HEALT out coronary ischemia contributing with risk factors. Patient should not do a regular stress test or echo due to his echo findings and baseline EKG. His cholesterol was at goal.  Will monitor pressures with changes.   He understands importance of watching

## 2020-03-04 NOTE — PROGRESS NOTES
STEPHANIE s/w patient who stated that he was at [address] up his prescriptions. He states he will call [facility] back as soon as he is done. NC provided contact information. The form was sent to the Physician's Nurse MSG Pool via In-Basket for review and signature.

## 2020-04-20 ENCOUNTER — TELEPHONE (OUTPATIENT)
Dept: INTERNAL MEDICINE CLINIC | Facility: CLINIC | Age: 50
End: 2020-04-20

## 2020-04-20 ENCOUNTER — TELEPHONE (OUTPATIENT)
Dept: NEPHROLOGY | Facility: CLINIC | Age: 50
End: 2020-04-20

## 2020-04-20 RX ORDER — CARVEDILOL 12.5 MG/1
18.75 TABLET ORAL 2 TIMES DAILY WITH MEALS
Qty: 270 TABLET | Refills: 1 | Status: CANCELLED | OUTPATIENT
Start: 2020-04-20

## 2020-04-20 NOTE — TELEPHONE ENCOUNTER
Per LOV note 12/6/19:  Increase [place] Cherelle to 12.5 mg twice daily. When you get your blood pressure cuff check your blood pressures and heart rates daily and call me in 1 week. Repeat your kidney blood test in early January 2020.   Orders are in the comp

## 2020-04-20 NOTE — TELEPHONE ENCOUNTER
Dose was raised on rx - he is out of rx and it is too soon to get refill per insurance     Current Outpatient Medications   Medication Sig Dispense Refill   •    3   •    3   • carvedilol 12.5 MG Oral Tab Take 0.5 tablets (6.25 mg total) by mouth 2 (two) t

## 2020-04-20 NOTE — TELEPHONE ENCOUNTER
Blood pressure still too high. Increase Coreg to 25 mg, 1 twice daily, #60, refills 2. Check blood pressures and heart rates daily and call in 2 weeks. Also do follow-up labs as ordered now.

## 2020-04-20 NOTE — TELEPHONE ENCOUNTER
Pharmacy message:  Per pt, he is supposed to be taking 18.75 mg dose. Please verify and send in new Rx with corrected directions. Along with strength, directions, quantity and refills.         Current Outpatient Medications:   •  carvedilol 12.5 MG Oral Ta

## 2020-04-21 RX ORDER — CARVEDILOL 25 MG/1
25 TABLET ORAL 2 TIMES DAILY WITH MEALS
Qty: 60 TABLET | Refills: 3 | Status: SHIPPED | OUTPATIENT
Start: 2020-04-21 | End: 2020-08-03

## 2020-04-21 NOTE — TELEPHONE ENCOUNTER
Patient contacted. Dr. Alto Deeth blood pressure advice relayed. New prescription sent to [address] [place] [address] confirmed with patient. He will monitor blood pressure and heart rates and call in 2 weeks with readings and will get lab work done as requested.

## 2020-07-31 NOTE — TELEPHONE ENCOUNTER
Current Outpatient Medications   Medication Sig Dispense Refill   • carvedilol (COREG) 25 MG Oral Tab Take 1 tablet (25 mg total) by mouth 2 (two) times daily with meals.  60 tablet 3     90 Day Supply Request

## 2020-08-03 RX ORDER — CARVEDILOL 25 MG/1
25 TABLET ORAL 2 TIMES DAILY WITH MEALS
Qty: 60 TABLET | Refills: 0 | Status: SHIPPED | OUTPATIENT
Start: 2020-08-03 | End: 2020-08-28

## 2020-08-03 NOTE — TELEPHONE ENCOUNTER
Last seen 12/6/19. Return to clinic in 6 mos (6/2020) There was an appointment scheduled for 6/2/2020 but no OV note. Please adcise. Refill(s) pended and routed to Dr. Maren Ferrara.

## 2020-08-28 RX ORDER — CARVEDILOL 25 MG/1
25 TABLET ORAL 2 TIMES DAILY WITH MEALS
Qty: 60 TABLET | Refills: 0 | Status: SHIPPED | OUTPATIENT
Start: 2020-08-28 | End: 2020-09-01

## 2020-08-28 NOTE — TELEPHONE ENCOUNTER
Last seen 12/6/19. Return to clinic in 6 mos (6/2020) Followup scheduled for 9/23/2020. Refill(s) pended and routed to Dr. Shanna Zacarias.

## 2020-08-28 NOTE — TELEPHONE ENCOUNTER
Patient called in to get refill on medication carvedilol (COREG) 25 MG Oral Tab. Patient is completely out of medicine.  Please advise thank you

## 2020-09-01 RX ORDER — CARVEDILOL 25 MG/1
TABLET ORAL
Qty: 180 TABLET | Refills: 0 | Status: SHIPPED | OUTPATIENT
Start: 2020-09-01 | End: 2021-01-11

## 2020-09-01 NOTE — TELEPHONE ENCOUNTER
Patient checking status on medication refill request - has been out since last Friday, 8/28/20. Please call. Thank you.

## 2020-09-01 NOTE — TELEPHONE ENCOUNTER
LOV 12/6/2019. Upcoming appointment 9/23/2020.  Pended and routed to [facility] for approval.

## 2020-09-01 NOTE — TELEPHONE ENCOUNTER
9/1/2020/ Spoke with patient, patient verbalized understanding of 6 hour fast before completing then labs. Trever Decker he will call the lab to schedule the appointment before his appointment with [facility] on 9/23.

## 2020-09-23 ENCOUNTER — TELEPHONE (OUTPATIENT)
Dept: NEPHROLOGY | Facility: CLINIC | Age: 50
End: 2020-09-23

## 2020-09-23 ENCOUNTER — OFFICE VISIT (OUTPATIENT)
Dept: NEPHROLOGY | Facility: CLINIC | Age: 50
End: 2020-09-23
Payer: COMMERCIAL

## 2020-09-23 ENCOUNTER — LAB ENCOUNTER (OUTPATIENT)
Dept: LAB | Age: 50
End: 2020-09-23
Attending: INTERNAL MEDICINE
Payer: COMMERCIAL

## 2020-09-23 VITALS
HEART RATE: 67 BPM | SYSTOLIC BLOOD PRESSURE: 149 MMHG | DIASTOLIC BLOOD PRESSURE: 85 MMHG | WEIGHT: 195.63 LBS | BODY MASS INDEX: 33.4 KG/M2 | TEMPERATURE: 98 F | HEIGHT: 64 IN

## 2020-09-23 DIAGNOSIS — N18.30 CKD (CHRONIC KIDNEY DISEASE) STAGE 3, GFR 30-59 ML/MIN (HCC): Primary | ICD-10-CM

## 2020-09-23 DIAGNOSIS — N17.9 AKI (ACUTE KIDNEY INJURY) (HCC): ICD-10-CM

## 2020-09-23 DIAGNOSIS — I10 ESSENTIAL HYPERTENSION: ICD-10-CM

## 2020-09-23 LAB
ALBUMIN SERPL-MCNC: 3.3 G/DL (ref 3.4–5)
ANION GAP SERPL CALC-SCNC: 5 MMOL/L (ref 0–18)
BASOPHILS # BLD AUTO: 0.12 X10(3) UL (ref 0–0.2)
BASOPHILS NFR BLD AUTO: 0.9 %
BUN BLD-MCNC: 25 MG/DL (ref 7–18)
BUN/CREAT SERPL: 12.1 (ref 10–20)
CALCIUM BLD-MCNC: 8.6 MG/DL (ref 8.5–10.1)
CHLORIDE SERPL-SCNC: 109 MMOL/L (ref 98–112)
CO2 SERPL-SCNC: 25 MMOL/L (ref 21–32)
CREAT BLD-MCNC: 2.07 MG/DL
DEPRECATED RDW RBC AUTO: 44.9 FL (ref 35.1–46.3)
EOSINOPHIL # BLD AUTO: 0.84 X10(3) UL (ref 0–0.7)
EOSINOPHIL NFR BLD AUTO: 6.2 %
ERYTHROCYTE [DISTWIDTH] IN BLOOD BY AUTOMATED COUNT: 12.4 % (ref 11–15)
GLUCOSE BLD-MCNC: 102 MG/DL (ref 70–99)
HCT VFR BLD AUTO: 44.1 %
HGB BLD-MCNC: 15.1 G/DL
IMM GRANULOCYTES # BLD AUTO: 0.07 X10(3) UL (ref 0–1)
IMM GRANULOCYTES NFR BLD: 0.5 %
LYMPHOCYTES # BLD AUTO: 4.65 X10(3) UL (ref 1–4)
LYMPHOCYTES NFR BLD AUTO: 34.6 %
MCH RBC QN AUTO: 33.8 PG (ref 26–34)
MCHC RBC AUTO-ENTMCNC: 34.2 G/DL (ref 31–37)
MCV RBC AUTO: 98.7 FL
MONOCYTES # BLD AUTO: 1.1 X10(3) UL (ref 0.1–1)
MONOCYTES NFR BLD AUTO: 8.2 %
NEUTROPHILS # BLD AUTO: 6.67 X10 (3) UL (ref 1.5–7.7)
NEUTROPHILS # BLD AUTO: 6.67 X10(3) UL (ref 1.5–7.7)
NEUTROPHILS NFR BLD AUTO: 49.6 %
OSMOLALITY SERPL CALC.SUM OF ELEC: 293 MOSM/KG (ref 275–295)
PHOSPHATE SERPL-MCNC: 3.4 MG/DL (ref 2.5–4.9)
PLATELET # BLD AUTO: 149 10(3)UL (ref 150–450)
POTASSIUM SERPL-SCNC: 4.3 MMOL/L (ref 3.5–5.1)
RBC # BLD AUTO: 4.47 X10(6)UL
SODIUM SERPL-SCNC: 139 MMOL/L (ref 136–145)
WBC # BLD AUTO: 13.5 X10(3) UL (ref 4–11)

## 2020-09-23 PROCEDURE — 3079F DIAST BP 80-89 MM HG: CPT | Performed by: INTERNAL MEDICINE

## 2020-09-23 PROCEDURE — 3077F SYST BP >= 140 MM HG: CPT | Performed by: INTERNAL MEDICINE

## 2020-09-23 PROCEDURE — 36415 COLL VENOUS BLD VENIPUNCTURE: CPT

## 2020-09-23 PROCEDURE — 80069 RENAL FUNCTION PANEL: CPT

## 2020-09-23 PROCEDURE — 99214 OFFICE O/P EST MOD 30 MIN: CPT | Performed by: INTERNAL MEDICINE

## 2020-09-23 PROCEDURE — 85025 COMPLETE CBC W/AUTO DIFF WBC: CPT

## 2020-09-23 PROCEDURE — 3008F BODY MASS INDEX DOCD: CPT | Performed by: INTERNAL MEDICINE

## 2020-09-23 NOTE — PATIENT INSTRUCTIONS
Check your blood pressures and heart rates daily and call me in 1 week. Repeat your kidney blood test in 3 months. Orders are in the computer. Make sure you follow-up with Dr. Lux Carranza shortly.

## 2020-09-23 NOTE — TELEPHONE ENCOUNTER
Labs from today shows that his kidney function remains abnormal but fairly stable compared to December 2019. Check blood pressures and heart rates daily and call me in a week. Repeat kidney blood test in 3 months. See me in 6 months for follow-up.

## 2020-09-23 NOTE — PROGRESS NOTES
09/23/20        Patient: Nikita Rey   YOB: 1970   Date of Visit: 9/23/2020       Dear  Dr. Marissa Garcia MD,      Thank you for referring Nikita Rey to my practice. Please find my assessment and plan below.       As you know he is a He knows to maintain adequate hydration and avoid any use of nonsteroidals. Encouraged him to quit smoking cigarettes. He was also strongly advised to see you for follow-up as he does feel as though he is having some problems with early depression.   Author Cummings

## 2020-09-30 NOTE — TELEPHONE ENCOUNTER
Patient contacted. Results message relayed.  Patient aware to repeat labs in 3 months (2020) and see Dr. Fifi Gamez in 6 months (3/2021)  Patient has standing orders for lab work that  2021) He will call back with his blood pressure readings Was

## 2021-01-11 ENCOUNTER — OFFICE VISIT (OUTPATIENT)
Dept: INTERNAL MEDICINE CLINIC | Facility: CLINIC | Age: 51
End: 2021-01-11
Payer: COMMERCIAL

## 2021-01-11 VITALS
BODY MASS INDEX: 32.1 KG/M2 | HEIGHT: 64 IN | SYSTOLIC BLOOD PRESSURE: 142 MMHG | WEIGHT: 188 LBS | TEMPERATURE: 98 F | HEART RATE: 101 BPM | DIASTOLIC BLOOD PRESSURE: 90 MMHG

## 2021-01-11 DIAGNOSIS — R73.01 ABNORMAL FASTING GLUCOSE: ICD-10-CM

## 2021-01-11 DIAGNOSIS — I10 ESSENTIAL HYPERTENSION: Primary | ICD-10-CM

## 2021-01-11 DIAGNOSIS — E78.5 HYPERLIPIDEMIA, UNSPECIFIED HYPERLIPIDEMIA TYPE: ICD-10-CM

## 2021-01-11 DIAGNOSIS — Z72.0 TOBACCO ABUSE: ICD-10-CM

## 2021-01-11 PROCEDURE — 3077F SYST BP >= 140 MM HG: CPT | Performed by: INTERNAL MEDICINE

## 2021-01-11 PROCEDURE — 3080F DIAST BP >= 90 MM HG: CPT | Performed by: INTERNAL MEDICINE

## 2021-01-11 PROCEDURE — 99214 OFFICE O/P EST MOD 30 MIN: CPT | Performed by: INTERNAL MEDICINE

## 2021-01-11 PROCEDURE — 3008F BODY MASS INDEX DOCD: CPT | Performed by: INTERNAL MEDICINE

## 2021-01-11 RX ORDER — ATORVASTATIN CALCIUM 40 MG/1
40 TABLET, FILM COATED ORAL NIGHTLY
Qty: 90 TABLET | Refills: 3 | Status: SHIPPED | OUTPATIENT
Start: 2021-01-11

## 2021-01-11 RX ORDER — CARVEDILOL 25 MG/1
25 TABLET ORAL 2 TIMES DAILY WITH MEALS
Qty: 180 TABLET | Refills: 3 | Status: SHIPPED | OUTPATIENT
Start: 2021-01-11 | End: 2022-01-18

## 2021-01-12 PROBLEM — I50.9 ACUTE ON CHRONIC CONGESTIVE HEART FAILURE, UNSPECIFIED HEART FAILURE TYPE (HCC): Status: RESOLVED | Noted: 2019-11-06 | Resolved: 2021-01-12

## 2021-01-12 PROBLEM — N17.9 AKI (ACUTE KIDNEY INJURY): Status: RESOLVED | Noted: 2019-11-23 | Resolved: 2021-01-12

## 2021-01-12 PROBLEM — I50.9 ACUTE ON CHRONIC CONGESTIVE HEART FAILURE (HCC): Status: RESOLVED | Noted: 2019-11-06 | Resolved: 2021-01-12

## 2021-01-12 PROBLEM — N17.9 AKI (ACUTE KIDNEY INJURY) (HCC): Status: RESOLVED | Noted: 2019-11-23 | Resolved: 2021-01-12

## 2021-01-12 NOTE — PROGRESS NOTES
HPI:    Patient ID: Robby Gowers is a 44year old male.     HPI    HTN  Long standing history of hypertension     sympotms  :        Headache no  dizziness        no                             Blurred vision no  palpitaionsSyncope no  Chest pain  no Diagnosis Date   • CHF (congestive heart failure) ([facility] [address]       History reviewed. No pertinent surgical history.    Family History   Problem Relation Age of Onset   • Hypertension Mother       Social History: Social History    Tobacco Use      Smoking statu 49.6   Lymphocytes %      % 34.6   Monocytes %      % 8.2   Eosinophils %      % 6.2   Basophils %      % 0.9   Immature Granulocyte %      % 0.5   Glucose      70 - 99 mg/dL 102 (H)   Sodium      136 - 145 mmol/L 139   Potassium      3.5 - 5.1 mmol/L 4.3

## 2021-08-23 ENCOUNTER — NURSE TRIAGE (OUTPATIENT)
Dept: INTERNAL MEDICINE CLINIC | Facility: CLINIC | Age: 51
End: 2021-08-23

## 2021-08-23 NOTE — TELEPHONE ENCOUNTER
would you be able to add pt to your schedule tomorrow? See below. Pt stated that he needs mental help. He stated that he has been trying to hold off but he needs help. Pt has anxiety and gets what he thinks is panick attacks.  He will start to

## 2021-08-24 PROBLEM — F41.1 GENERALIZED ANXIETY DISORDER: Status: ACTIVE | Noted: 2021-08-24

## 2021-08-24 PROBLEM — F33.1 MODERATE EPISODE OF RECURRENT MAJOR DEPRESSIVE DISORDER (HCC): Status: ACTIVE | Noted: 2021-08-24

## 2021-08-24 NOTE — PATIENT INSTRUCTIONS
Depression  Depression is one of the most common mental health problems today. It is not just a state of unhappiness or sadness. It is a true disease. The cause seems to be linked to a drop in chemicals that transmit signals in the brain.  Having a family (low in saturated fat and high in fruits and vegetables). Exercise at least 3 times a week for 30 minutes. Even mild to moderate exercise (like brisk walking) can make you feel better.   · Don't make major decisions, such as a job change, a divorce, or a ma hear  · Seeing things that others do not see  · Not sleeping or eating for 3 days in a row  · Having friends or family express concern over your behavior and ask you to seek help  Calista last reviewed this educational content on 7/1/2020 © 2000-2021 The of therapy as your treatment needs change. This may mean meeting with a therapist by yourself or in a group. Therapy can also help you work through problems in your life, such as drug or alcohol dependence, that may be making your anxiety worse.      Nader Joe relief. They only make things worse in the long run. Calista last reviewed this educational content on 5/1/2020  © 6205-2296 The [address]. All rights reserved. This information is not intended as a substitute for professional medical care.  A schedule. Anti-anxiety medicine may make you feel a little sleepy or “out of it.” Don’t drive a car or operate machinery while on this medicine, until you know how it affects you. Never use alcohol or other drugs with anti-anxiety medicines.  This could re you have a sexual side effect that concerns you, tell your healthcare provider. · Addiction. If She Mendoza never had a problem with drugs or alcohol, you may not have a problem with medicines used to treat anxiety disorders.  But always discuss the medicines w

## 2021-08-24 NOTE — PROGRESS NOTES
Patient ID: Sherry Garcia is a 46year old male.   Patient presents with:  Depression: \" I need some medications for anxiety and depression, feeelings of hopelessness\"  Pt reports getting worse recently, \"the last year has been awful\"        HISTOR atorvastatin 40 MG Oral Tab, Take 1 tablet (40 mg total) by mouth nightly., Disp: 90 tablet, Rfl: 3  •  aspirin (ASPIRIN 81) 81 MG Oral Tab EC, Take 1 tablet (81 mg total) by mouth daily. , Disp: 90 tablet, Rfl: 3    Allergies:No Known Allergies    Social H [employer] Services:       Active Member of Clubs or Organizations:       Attends Club or Organization Meetings:       Marital Status:   Intimate Partner Violence:       Fear of Current or Ex-Partner:       Emotionally Abused:       Physically Abused:

## 2021-08-26 ENCOUNTER — TELEPHONE (OUTPATIENT)
Dept: INTERNAL MEDICINE CLINIC | Facility: CLINIC | Age: 51
End: 2021-08-26

## 2021-08-26 NOTE — TELEPHONE ENCOUNTER
Patient informed of message below. States he took Lexapro 10mg, 1 tablet, for the past 2 nights. He is aware that it is only once a day. Says he is feeling a little better today. Thinks he might have been \"sleep deprived\".   Patient will give medicatio A-T Advancement Flap Text: The defect edges were debeveled with a #15 scalpel blade.  Given the location of the defect, shape of the defect and the proximity to free margins an A-T advancement flap was deemed most appropriate.  Using a sterile surgical marker, an appropriate advancement flap was drawn incorporating the defect and placing the expected incisions within the relaxed skin tension lines where possible.    The area thus outlined was incised deep to adipose tissue with a #15 scalpel blade.  The skin margins were undermined to an appropriate distance in all directions utilizing iris scissors.

## 2021-08-26 NOTE — TELEPHONE ENCOUNTER
Patient states he started taking Escitalopram 20 mg for past 2 days and he feels \"tingly from head to toe, like I took too much cold medicine\". Yesterday he felt tired, today a little less tired. He denies blurry vision, dizziness or chest pain.      Nancy

## 2021-08-27 NOTE — TELEPHONE ENCOUNTER
Side effects he is experiencing or not that uncommon when for starting this medication. I agree and that he should continue taking as they should dissipate. If they do not then have him call back the office.

## 2021-09-04 NOTE — PROGRESS NOTES
HPI:    Patient ID: Scar Rosenberg is a 46year old male.     HPI    Depression with anhedonia     Pt feels alone  Really no one to talk to  Called by Aminah Hensley  Was offered intensive outpatient therapy  With high high deductive  And cost  Whic total) by mouth 3 (three) times daily as needed for Anxiety.  (Patient not taking: Reported on 9/3/2021 ) 30 tablet 0     Allergies:No Known Allergies    HISTORY:  Past Medical History:   Diagnosis Date   • CHF (congestive heart failure) (HCC)       No past ordered  Follow up with cardiology  See psychaitry  Complete abstinence from alcohol  Medications and most recent test results reviewed  Refill medicaitons  as needed  Potential side effect discussed  Modification of risk for CAD advised    Dietary an life

## 2021-11-19 DIAGNOSIS — F33.1 MODERATE EPISODE OF RECURRENT MAJOR DEPRESSIVE DISORDER (HCC): ICD-10-CM

## 2021-11-20 RX ORDER — ESCITALOPRAM OXALATE 10 MG/1
TABLET ORAL
Qty: 90 TABLET | Refills: 0 | Status: SHIPPED | OUTPATIENT
Start: 2021-11-20

## 2021-12-30 ENCOUNTER — TELEPHONE (OUTPATIENT)
Dept: INTERNAL MEDICINE CLINIC | Facility: CLINIC | Age: 51
End: 2021-12-30

## 2021-12-30 NOTE — TELEPHONE ENCOUNTER
Clinical staff at [facility] did not call Pt unsure who contacted Pt . Will route to MMP and RN to confirm If they contacted Pt .

## 2021-12-30 NOTE — TELEPHONE ENCOUNTER
Patient said he had a missed phone call. No message was left and he is not sure what it may have been in regards to. Patient requesting return phone call.

## 2022-01-10 DIAGNOSIS — F41.1 GENERALIZED ANXIETY DISORDER: ICD-10-CM

## 2022-01-10 RX ORDER — CLONAZEPAM 0.5 MG/1
TABLET ORAL
Qty: 30 TABLET | Refills: 0 | Status: SHIPPED | OUTPATIENT
Start: 2022-01-10

## 2022-01-18 RX ORDER — CARVEDILOL 25 MG/1
TABLET ORAL
Qty: 180 TABLET | Refills: 3 | Status: SHIPPED | OUTPATIENT
Start: 2022-01-18

## 2022-03-09 ENCOUNTER — LAB ENCOUNTER (OUTPATIENT)
Dept: LAB | Age: 52
End: 2022-03-09
Attending: INTERNAL MEDICINE
Payer: COMMERCIAL

## 2022-03-09 ENCOUNTER — OFFICE VISIT (OUTPATIENT)
Dept: INTERNAL MEDICINE CLINIC | Facility: CLINIC | Age: 52
End: 2022-03-09
Payer: COMMERCIAL

## 2022-03-09 VITALS
DIASTOLIC BLOOD PRESSURE: 97 MMHG | SYSTOLIC BLOOD PRESSURE: 160 MMHG | HEART RATE: 61 BPM | WEIGHT: 206 LBS | BODY MASS INDEX: 35.17 KG/M2 | HEIGHT: 64 IN

## 2022-03-09 DIAGNOSIS — E78.5 HYPERLIPIDEMIA, UNSPECIFIED HYPERLIPIDEMIA TYPE: ICD-10-CM

## 2022-03-09 DIAGNOSIS — E55.9 VITAMIN D DEFICIENCY: ICD-10-CM

## 2022-03-09 DIAGNOSIS — Z12.5 PROSTATE CANCER SCREENING: ICD-10-CM

## 2022-03-09 DIAGNOSIS — I10 ESSENTIAL HYPERTENSION: Primary | ICD-10-CM

## 2022-03-09 DIAGNOSIS — F41.8 DEPRESSION WITH ANXIETY: ICD-10-CM

## 2022-03-09 DIAGNOSIS — R73.01 ABNORMAL FASTING GLUCOSE: ICD-10-CM

## 2022-03-09 LAB
ALBUMIN SERPL-MCNC: 3.6 G/DL (ref 3.4–5)
ALP LIVER SERPL-CCNC: 89 U/L
ALT SERPL-CCNC: 42 U/L
ANION GAP SERPL CALC-SCNC: 7 MMOL/L (ref 0–18)
AST SERPL-CCNC: 27 U/L (ref 15–37)
BILIRUB SERPL-MCNC: 0.5 MG/DL (ref 0.1–2)
BUN BLD-MCNC: 18 MG/DL (ref 7–18)
BUN/CREAT SERPL: 11.8 (ref 10–20)
CALCIUM BLD-MCNC: 9.1 MG/DL (ref 8.5–10.1)
CHLORIDE SERPL-SCNC: 107 MMOL/L (ref 98–112)
CHOLEST SERPL-MCNC: 174 MG/DL (ref ?–200)
CO2 SERPL-SCNC: 27 MMOL/L (ref 21–32)
COMPLEXED PSA SERPL-MCNC: 1.05 NG/ML (ref ?–4)
DEPRECATED RDW RBC AUTO: 46.1 FL (ref 35.1–46.3)
ERYTHROCYTE [DISTWIDTH] IN BLOOD BY AUTOMATED COUNT: 12.2 % (ref 11–15)
EST. AVERAGE GLUCOSE BLD GHB EST-MCNC: 117 MG/DL (ref 68–126)
FASTING PATIENT LIPID ANSWER: YES
FASTING STATUS PATIENT QL REPORTED: YES
FOLATE SERPL-MCNC: 14.6 NG/ML (ref 8.7–?)
GLOBULIN PLAS-MCNC: 3.5 G/DL (ref 2.8–4.4)
GLUCOSE BLD-MCNC: 99 MG/DL (ref 70–99)
HBA1C MFR BLD: 5.7 % (ref ?–5.7)
HCT VFR BLD AUTO: 52.8 %
HDLC SERPL-MCNC: 43 MG/DL (ref 40–59)
HGB BLD-MCNC: 17.3 G/DL
LDLC SERPL CALC-MCNC: 83 MG/DL (ref ?–100)
MCH RBC QN AUTO: 33.3 PG (ref 26–34)
MCHC RBC AUTO-ENTMCNC: 32.8 G/DL (ref 31–37)
MCV RBC AUTO: 101.5 FL
NONHDLC SERPL-MCNC: 131 MG/DL (ref ?–130)
OSMOLALITY SERPL CALC.SUM OF ELEC: 294 MOSM/KG (ref 275–295)
PLATELET # BLD AUTO: 143 10(3)UL (ref 150–450)
POTASSIUM SERPL-SCNC: 5.2 MMOL/L (ref 3.5–5.1)
PROT SERPL-MCNC: 7.1 G/DL (ref 6.4–8.2)
RBC # BLD AUTO: 5.2 X10(6)UL
SODIUM SERPL-SCNC: 141 MMOL/L (ref 136–145)
T4 FREE SERPL-MCNC: 0.8 NG/DL (ref 0.8–1.7)
TRIGL SERPL-MCNC: 294 MG/DL (ref 30–149)
TSI SER-ACNC: 1.23 MIU/ML (ref 0.36–3.74)
VIT B12 SERPL-MCNC: 453 PG/ML (ref 193–986)
VIT D+METAB SERPL-MCNC: 11.8 NG/ML (ref 30–100)
VLDLC SERPL CALC-MCNC: 47 MG/DL (ref 0–30)
WBC # BLD AUTO: 10.5 X10(3) UL (ref 4–11)

## 2022-03-09 PROCEDURE — 84443 ASSAY THYROID STIM HORMONE: CPT

## 2022-03-09 PROCEDURE — 81015 MICROSCOPIC EXAM OF URINE: CPT

## 2022-03-09 PROCEDURE — 36415 COLL VENOUS BLD VENIPUNCTURE: CPT

## 2022-03-09 PROCEDURE — 3077F SYST BP >= 140 MM HG: CPT | Performed by: INTERNAL MEDICINE

## 2022-03-09 PROCEDURE — 82306 VITAMIN D 25 HYDROXY: CPT

## 2022-03-09 PROCEDURE — 83036 HEMOGLOBIN GLYCOSYLATED A1C: CPT

## 2022-03-09 PROCEDURE — 84439 ASSAY OF FREE THYROXINE: CPT

## 2022-03-09 PROCEDURE — 3080F DIAST BP >= 90 MM HG: CPT | Performed by: INTERNAL MEDICINE

## 2022-03-09 PROCEDURE — 80061 LIPID PANEL: CPT

## 2022-03-09 PROCEDURE — 82607 VITAMIN B-12: CPT

## 2022-03-09 PROCEDURE — 80053 COMPREHEN METABOLIC PANEL: CPT

## 2022-03-09 PROCEDURE — 85027 COMPLETE CBC AUTOMATED: CPT

## 2022-03-09 PROCEDURE — 99396 PREV VISIT EST AGE 40-64: CPT | Performed by: INTERNAL MEDICINE

## 2022-03-09 PROCEDURE — 3008F BODY MASS INDEX DOCD: CPT | Performed by: INTERNAL MEDICINE

## 2022-03-09 PROCEDURE — 82746 ASSAY OF FOLIC ACID SERUM: CPT

## 2022-03-09 RX ORDER — ERGOCALCIFEROL (VITAMIN D2) 1250 MCG
50000 CAPSULE ORAL WEEKLY
Qty: 12 CAPSULE | Refills: 1 | Status: SHIPPED | OUTPATIENT
Start: 2022-03-09 | End: 2022-03-28

## 2022-03-09 RX ORDER — LISINOPRIL 20 MG/1
20 TABLET ORAL DAILY
Qty: 90 TABLET | Refills: 1 | Status: SHIPPED | OUTPATIENT
Start: 2022-03-09 | End: 2023-03-09

## 2022-03-24 ENCOUNTER — LAB ENCOUNTER (OUTPATIENT)
Dept: LAB | Age: 52
End: 2022-03-24
Attending: INTERNAL MEDICINE
Payer: COMMERCIAL

## 2022-03-24 DIAGNOSIS — E87.5 HYPERKALEMIA: ICD-10-CM

## 2022-03-24 LAB — POTASSIUM SERPL-SCNC: 4.3 MMOL/L (ref 3.5–5.1)

## 2022-03-24 PROCEDURE — 36415 COLL VENOUS BLD VENIPUNCTURE: CPT

## 2022-03-24 PROCEDURE — 84132 ASSAY OF SERUM POTASSIUM: CPT

## 2022-03-28 ENCOUNTER — OFFICE VISIT (OUTPATIENT)
Dept: INTERNAL MEDICINE CLINIC | Facility: CLINIC | Age: 52
End: 2022-03-28
Payer: COMMERCIAL

## 2022-03-28 VITALS
SYSTOLIC BLOOD PRESSURE: 123 MMHG | WEIGHT: 206 LBS | BODY MASS INDEX: 35.17 KG/M2 | DIASTOLIC BLOOD PRESSURE: 85 MMHG | HEART RATE: 66 BPM | RESPIRATION RATE: 16 BRPM | HEIGHT: 64 IN

## 2022-03-28 DIAGNOSIS — L72.9 INFECTED CYST OF SKIN: ICD-10-CM

## 2022-03-28 DIAGNOSIS — D17.1 LIPOMA OF TORSO: Primary | ICD-10-CM

## 2022-03-28 DIAGNOSIS — L08.9 INFECTED CYST OF SKIN: ICD-10-CM

## 2022-03-28 PROCEDURE — 99213 OFFICE O/P EST LOW 20 MIN: CPT | Performed by: INTERNAL MEDICINE

## 2022-03-28 PROCEDURE — 3008F BODY MASS INDEX DOCD: CPT | Performed by: INTERNAL MEDICINE

## 2022-03-28 PROCEDURE — 3074F SYST BP LT 130 MM HG: CPT | Performed by: INTERNAL MEDICINE

## 2022-03-28 PROCEDURE — 3079F DIAST BP 80-89 MM HG: CPT | Performed by: INTERNAL MEDICINE

## 2022-03-28 RX ORDER — CLONAZEPAM 0.5 MG/1
0.25 TABLET ORAL 3 TIMES DAILY PRN
Qty: 30 TABLET | Refills: 0 | Status: CANCELLED | OUTPATIENT
Start: 2022-03-28

## 2022-03-28 RX ORDER — CEPHALEXIN 500 MG/1
500 CAPSULE ORAL 3 TIMES DAILY
Qty: 21 CAPSULE | Refills: 0 | Status: SHIPPED | OUTPATIENT
Start: 2022-03-28 | End: 2022-04-04

## 2022-04-18 RX ORDER — ATORVASTATIN CALCIUM 40 MG/1
40 TABLET, FILM COATED ORAL NIGHTLY
Qty: 90 TABLET | Refills: 1 | Status: SHIPPED | OUTPATIENT
Start: 2022-04-18 | End: 2022-11-11

## 2022-04-18 NOTE — TELEPHONE ENCOUNTER
Refill passed per [facility] protocol. Requested Prescriptions   Pending Prescriptions Disp Refills    atorvastatin 40 MG Oral Tab 90 tablet 1     Sig: Take 1 tablet (40 mg total) by mouth nightly.         Cholesterol Medication Protocol Passed - 4/18/2022 12:43 PM        Passed - ALT in past 12 months        Passed - LDL in past 12 months        Passed - Last ALT < 80       Lab Results   Component Value Date    ALT 42 03/09/2022             Passed - Last LDL < 130     Lab Results   Component Value Date    LDL 83 03/09/2022               Passed - Appointment in past 12 or next 3 months                Recent Outpatient Visits              6 days ago Generalized anxiety disorder    Cassandra Sams MD    Office Visit    3 weeks ago Lipoma of torso    Medhat Cr MD    Office Visit    1 month ago Essential hypertension    Sherley Aparicio MD    Office Visit    7 months ago Current severe episode of major depressive 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 without prior episode [facility])    Cassandra Sams MD    Office Visit    7 months ago Moderate episode of recurrent major depressive disorder [facility])    [place] [email or web address] [place], [facility], [address] Johnathon Vazquez MD    Office Visit

## 2022-07-29 ENCOUNTER — TELEPHONE (OUTPATIENT)
Dept: INTERNAL MEDICINE CLINIC | Facility: CLINIC | Age: 52
End: 2022-07-29

## 2022-07-29 DIAGNOSIS — F41.1 GENERALIZED ANXIETY DISORDER: ICD-10-CM

## 2022-08-01 RX ORDER — CLONAZEPAM 0.5 MG/1
0.5 TABLET ORAL 2 TIMES DAILY PRN
Qty: 60 TABLET | Refills: 0 | Status: SHIPPED | OUTPATIENT
Start: 2022-08-01

## 2022-08-01 RX ORDER — LISINOPRIL 20 MG/1
TABLET ORAL
Qty: 90 TABLET | Refills: 1 | Status: SHIPPED | OUTPATIENT
Start: 2022-08-01

## 2022-08-08 NOTE — TELEPHONE ENCOUNTER
Spoke with JOSE Sanchez verified  She was informed last ref was on 22 # 180 with 3 refill.   She stated to pls disregard this request.

## 2022-08-08 NOTE — TELEPHONE ENCOUNTER
Pharmacy is calling regarding refill request.     CARVEDILOL 25 MG Oral Tab  sertraline 50 MG Oral Tab  atorvastatin 40 MG Oral Tab

## 2023-02-16 DIAGNOSIS — F41.1 GENERALIZED ANXIETY DISORDER: ICD-10-CM

## 2023-02-17 RX ORDER — CARVEDILOL 25 MG/1
TABLET ORAL
Qty: 180 TABLET | Refills: 3 | Status: SHIPPED | OUTPATIENT
Start: 2023-02-17

## 2023-02-17 RX ORDER — CLONAZEPAM 0.5 MG/1
TABLET ORAL
Qty: 60 TABLET | Refills: 0 | Status: SHIPPED | OUTPATIENT
Start: 2023-02-17

## 2023-02-21 RX ORDER — LISINOPRIL 20 MG/1
20 TABLET ORAL DAILY
Qty: 90 TABLET | Refills: 1 | Status: SHIPPED | OUTPATIENT
Start: 2023-02-21

## 2023-02-21 NOTE — TELEPHONE ENCOUNTER
Please review refill failed/no protocol     Requested Prescriptions     Pending Prescriptions Disp Refills    LISINOPRIL 20 MG Oral Tab [Pharmacy Med Name: LISINOPRIL 20MG TABLETS] 90 tablet 1     Sig: TAKE 1 TABLET(20 MG) BY MOUTH DAILY         Recent Visits  Date Type Provider Dept   04/12/22 Office Visit Rick Holley MD [facility]   03/28/22 Office Visit Marian Chong MD [facility]   03/09/22 Office Visit Rick Holley MD [facility]   09/03/21 Office Visit Rick Holley MD [facility]   Showing recent visits within past 540 days with a meds authorizing provider and meeting all other requirements  Future Appointments  No visits were found meeting these conditions. Showing future appointments within next 150 days with a meds authorizing provider and meeting all other requirements    Requested Prescriptions   Pending Prescriptions Disp Refills    LISINOPRIL 20 MG Oral Tab [Pharmacy Med Name: LISINOPRIL 20MG TABLETS] 90 tablet 1     Sig: TAKE 1 TABLET(20 MG) BY MOUTH DAILY       Hypertensive Medications Protocol Failed - 2/20/2023  5:29 PM        Failed - CMP or BMP in past 6 months     No results found for this or any previous visit (from the past 4392 hour(s)).             Failed - In person appointment or virtual visit in the past 6 months     Recent Outpatient Visits              10 months ago Moderate episode of recurrent major depressive disorder [facility])    Tae Gil MD    Office Visit    11 months ago Lipoma of torso    Jordyn Gil MD    Office Visit    11 months ago Essential hypertension    [address] [facility], Brianna Casillas MD    Office Visit    1 year ago Current severe episode of major depressive 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 without prior episode [facility])    Judson Benitez Manju Casanova MD    Office Visit    1 year ago Moderate episode of recurrent major depressive disorder [facility])    [address] [facility] Sagastume [place], MD    Office Visit                      Passed - In person appointment in the past 12 or next 3 months     Recent Outpatient Visits              10 months ago Moderate episode of recurrent major depressive disorder [facility])    Shubham Sood MD    Office Visit    11 months ago [facility] torso    [facility]    [facility] Visit    11 months ago Essential hypertension    [address] [place] [address], Angela Krishna MD    Office Visit    1 year ago Current severe episode of major 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

## 2023-06-01 ENCOUNTER — OFFICE VISIT (OUTPATIENT)
Dept: INTERNAL MEDICINE CLINIC | Facility: CLINIC | Age: 53
End: 2023-06-01

## 2023-06-01 VITALS
OXYGEN SATURATION: 97 % | BODY MASS INDEX: 33.12 KG/M2 | DIASTOLIC BLOOD PRESSURE: 80 MMHG | HEART RATE: 75 BPM | HEIGHT: 64 IN | WEIGHT: 194 LBS | SYSTOLIC BLOOD PRESSURE: 114 MMHG

## 2023-06-01 DIAGNOSIS — J01.01 ACUTE RECURRENT MAXILLARY SINUSITIS: Primary | ICD-10-CM

## 2023-06-01 DIAGNOSIS — J01.01 ACUTE RECURRENT MAXILLARY SINUSITIS: ICD-10-CM

## 2023-06-01 PROCEDURE — 3074F SYST BP LT 130 MM HG: CPT | Performed by: INTERNAL MEDICINE

## 2023-06-01 PROCEDURE — 3008F BODY MASS INDEX DOCD: CPT | Performed by: INTERNAL MEDICINE

## 2023-06-01 PROCEDURE — 3079F DIAST BP 80-89 MM HG: CPT | Performed by: INTERNAL MEDICINE

## 2023-06-01 PROCEDURE — 99214 OFFICE O/P EST MOD 30 MIN: CPT | Performed by: INTERNAL MEDICINE

## 2023-06-01 RX ORDER — FLUTICASONE PROPIONATE 50 MCG
2 SPRAY, SUSPENSION (ML) NASAL DAILY
Qty: 15.8 ML | Refills: 0 | Status: SHIPPED | OUTPATIENT
Start: 2023-06-01 | End: 2023-06-01

## 2023-06-01 RX ORDER — BENZONATATE 200 MG/1
200 CAPSULE ORAL 3 TIMES DAILY PRN
Qty: 30 CAPSULE | Refills: 0 | Status: SHIPPED | OUTPATIENT
Start: 2023-06-01

## 2023-06-01 RX ORDER — FLUTICASONE PROPIONATE 50 MCG
SPRAY, SUSPENSION (ML) NASAL
Qty: 48 G | Refills: 0 | Status: SHIPPED | OUTPATIENT
Start: 2023-06-01

## 2023-06-01 RX ORDER — AMOXICILLIN AND CLAVULANATE POTASSIUM 875; 125 MG/1; MG/1
1 TABLET, FILM COATED ORAL 2 TIMES DAILY
Qty: 20 TABLET | Refills: 0 | Status: SHIPPED | OUTPATIENT
Start: 2023-06-01 | End: 2023-06-11

## 2023-06-01 NOTE — TELEPHONE ENCOUNTER
Refill passed per [place] [facility] protocol. Requesting 90 day supply.  Rx sent today for 1 with zero refills    Requested Prescriptions   Pending Prescriptions Disp Refills    FLUTICASONE PROPIONATE 50 MCG/ACT Nasal Suspension [Pharmacy Med Name: FLUTICASONE 50MCG NASAL SP (120) RX] 48 g 0     Sig: SHAKE LIQUID AND USE 2 SPRAYS IN EACH NOSTRIL DAILY       Allergy Medication Protocol Passed - 6/1/2023  4:13 PM        Passed - In person appointment or virtual visit in the past 12 mos or appointment in next 3 mos     Recent Outpatient Visits              Today Acute recurrent maxillary sinusitis    Darline Walters MD    Office Visit    1 year ago Moderate episode of recurrent major depressive disorder [facility])    Olivia Varela MD    Office Visit    1 year ago Lipoma of torso    Bernestine MD Angle    Office Visit    1 year ago Essential hypertension    [address] [facility], Edmar Canales MD    Office Visit    1 year ago Current severe episode of major depressive 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 without prior episode [facility])    Olivia Varela MD    Office Visit          Future Appointments         Provider Department Appt Notes    In 1 month June MD [facility] [address] [place] Elian aMrtin (policy informed)                  Future Appointments         Provider Department Appt Notes    In 1 month June MD [facility] [address] [place] Elian Martin (policy informed)          Recent Outpatient Visits              Today Acute recurrent maxillary sinusitis    Darline Walters MD    Office Visit    1 year ago Moderate episode of recurrent major depressive disorder [facility])    Adelaide Jackman MD    Office Visit    1 year ago Lipoma of torso    Dovjaelyn Simon, Parminder Rayo MD    Office Visit    1 year ago Essential hypertension    [address] [facility], Rhianna Bearden MD    Office Visit    1 year ago Current severe episode of major depressive 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 without prior episode [facility])    Adelaide Jackman MD    Office Visit

## 2023-07-24 ENCOUNTER — OFFICE VISIT (OUTPATIENT)
Dept: INTERNAL MEDICINE CLINIC | Facility: CLINIC | Age: 53
End: 2023-07-24

## 2023-07-24 VITALS
SYSTOLIC BLOOD PRESSURE: 137 MMHG | BODY MASS INDEX: 34.49 KG/M2 | HEIGHT: 64 IN | HEART RATE: 69 BPM | WEIGHT: 202 LBS | DIASTOLIC BLOOD PRESSURE: 87 MMHG

## 2023-07-24 DIAGNOSIS — R71.8 ELEVATED MCV: ICD-10-CM

## 2023-07-24 DIAGNOSIS — Z72.0 TOBACCO ABUSE: ICD-10-CM

## 2023-07-24 DIAGNOSIS — N18.31 STAGE 3A CHRONIC KIDNEY DISEASE (HCC): ICD-10-CM

## 2023-07-24 DIAGNOSIS — E78.49 OTHER HYPERLIPIDEMIA: ICD-10-CM

## 2023-07-24 DIAGNOSIS — L98.9 SKIN LESION: ICD-10-CM

## 2023-07-24 DIAGNOSIS — Z00.00 PHYSICAL EXAM: Primary | ICD-10-CM

## 2023-07-24 DIAGNOSIS — Z12.5 SCREENING FOR PROSTATE CANCER: ICD-10-CM

## 2023-07-24 DIAGNOSIS — F33.1 MODERATE EPISODE OF RECURRENT MAJOR DEPRESSIVE DISORDER (HCC): ICD-10-CM

## 2023-07-24 DIAGNOSIS — Z12.11 SCREENING FOR COLON CANCER: ICD-10-CM

## 2023-07-24 DIAGNOSIS — B35.1 FUNGAL NAIL INFECTION: ICD-10-CM

## 2023-07-24 DIAGNOSIS — D69.6 THROMBOCYTOPENIA (HCC): ICD-10-CM

## 2023-07-24 DIAGNOSIS — I10 ESSENTIAL HYPERTENSION: ICD-10-CM

## 2023-07-24 DIAGNOSIS — E55.9 VITAMIN D DEFICIENCY: ICD-10-CM

## 2023-07-24 PROCEDURE — 3008F BODY MASS INDEX DOCD: CPT | Performed by: INTERNAL MEDICINE

## 2023-07-24 PROCEDURE — 99396 PREV VISIT EST AGE 40-64: CPT | Performed by: INTERNAL MEDICINE

## 2023-07-24 PROCEDURE — 3075F SYST BP GE 130 - 139MM HG: CPT | Performed by: INTERNAL MEDICINE

## 2023-07-24 PROCEDURE — 99213 OFFICE O/P EST LOW 20 MIN: CPT | Performed by: INTERNAL MEDICINE

## 2023-07-24 PROCEDURE — 3079F DIAST BP 80-89 MM HG: CPT | Performed by: INTERNAL MEDICINE

## 2023-07-30 PROBLEM — I50.32 CHRONIC DIASTOLIC HEART FAILURE (HCC): Status: RESOLVED | Noted: 2019-12-26 | Resolved: 2023-07-30

## 2023-07-30 RX ORDER — ASPIRIN 81 MG/1
81 TABLET ORAL DAILY
Qty: 90 TABLET | Refills: 3 | COMMUNITY
Start: 2023-07-30

## 2023-08-08 ENCOUNTER — LAB ENCOUNTER (OUTPATIENT)
Dept: LAB | Age: 53
End: 2023-08-08
Attending: INTERNAL MEDICINE
Payer: COMMERCIAL

## 2023-08-08 DIAGNOSIS — Z12.5 SCREENING FOR PROSTATE CANCER: ICD-10-CM

## 2023-08-08 DIAGNOSIS — E55.9 VITAMIN D DEFICIENCY: ICD-10-CM

## 2023-08-08 DIAGNOSIS — Z00.00 PHYSICAL EXAM: ICD-10-CM

## 2023-08-08 LAB
ALBUMIN SERPL-MCNC: 3.1 G/DL (ref 3.4–5)
ALBUMIN/GLOB SERPL: 0.8 {RATIO} (ref 1–2)
ALP LIVER SERPL-CCNC: 82 U/L
ALT SERPL-CCNC: 24 U/L
ANION GAP SERPL CALC-SCNC: 6 MMOL/L (ref 0–18)
AST SERPL-CCNC: 16 U/L (ref 15–37)
BASOPHILS # BLD AUTO: 0.06 X10(3) UL (ref 0–0.2)
BASOPHILS NFR BLD AUTO: 0.5 %
BILIRUB SERPL-MCNC: 0.4 MG/DL (ref 0.1–2)
BILIRUB UR QL: NEGATIVE
BUN BLD-MCNC: 28 MG/DL (ref 7–18)
BUN/CREAT SERPL: 15.1 (ref 10–20)
CALCIUM BLD-MCNC: 8.8 MG/DL (ref 8.5–10.1)
CHLORIDE SERPL-SCNC: 107 MMOL/L (ref 98–112)
CHOLEST SERPL-MCNC: 214 MG/DL (ref ?–200)
CLARITY UR: CLEAR
CO2 SERPL-SCNC: 26 MMOL/L (ref 21–32)
COLOR UR: COLORLESS
COMPLEXED PSA SERPL-MCNC: 1.3 NG/ML (ref ?–4)
CREAT BLD-MCNC: 1.85 MG/DL
DEPRECATED RDW RBC AUTO: 44.7 FL (ref 35.1–46.3)
EGFRCR SERPLBLD CKD-EPI 2021: 42 ML/MIN/1.73M2 (ref 60–?)
EOSINOPHIL # BLD AUTO: 0.42 X10(3) UL (ref 0–0.7)
EOSINOPHIL NFR BLD AUTO: 3.6 %
ERYTHROCYTE [DISTWIDTH] IN BLOOD BY AUTOMATED COUNT: 12.1 % (ref 11–15)
EST. AVERAGE GLUCOSE BLD GHB EST-MCNC: 114 MG/DL (ref 68–126)
FASTING PATIENT LIPID ANSWER: YES
FASTING STATUS PATIENT QL REPORTED: YES
GLOBULIN PLAS-MCNC: 3.8 G/DL (ref 2.8–4.4)
GLUCOSE BLD-MCNC: 98 MG/DL (ref 70–99)
GLUCOSE UR-MCNC: NORMAL MG/DL
HBA1C MFR BLD: 5.6 % (ref ?–5.7)
HCT VFR BLD AUTO: 50.6 %
HDLC SERPL-MCNC: 35 MG/DL (ref 40–59)
HGB BLD-MCNC: 17 G/DL
HGB UR QL STRIP.AUTO: NEGATIVE
IMM GRANULOCYTES # BLD AUTO: 0.04 X10(3) UL (ref 0–1)
IMM GRANULOCYTES NFR BLD: 0.3 %
KETONES UR-MCNC: NEGATIVE MG/DL
LDLC SERPL CALC-MCNC: 112 MG/DL (ref ?–100)
LEUKOCYTE ESTERASE UR QL STRIP.AUTO: NEGATIVE
LYMPHOCYTES # BLD AUTO: 3.06 X10(3) UL (ref 1–4)
LYMPHOCYTES NFR BLD AUTO: 26.4 %
MCH RBC QN AUTO: 33.4 PG (ref 26–34)
MCHC RBC AUTO-ENTMCNC: 33.6 G/DL (ref 31–37)
MCV RBC AUTO: 99.4 FL
MONOCYTES # BLD AUTO: 0.83 X10(3) UL (ref 0.1–1)
MONOCYTES NFR BLD AUTO: 7.2 %
NEUTROPHILS # BLD AUTO: 7.18 X10 (3) UL (ref 1.5–7.7)
NEUTROPHILS # BLD AUTO: 7.18 X10(3) UL (ref 1.5–7.7)
NEUTROPHILS NFR BLD AUTO: 62 %
NITRITE UR QL STRIP.AUTO: NEGATIVE
NONHDLC SERPL-MCNC: 179 MG/DL (ref ?–130)
OSMOLALITY SERPL CALC.SUM OF ELEC: 293 MOSM/KG (ref 275–295)
PH UR: 6 [PH] (ref 5–8)
PLATELET # BLD AUTO: 136 10(3)UL (ref 150–450)
POTASSIUM SERPL-SCNC: 4.7 MMOL/L (ref 3.5–5.1)
PROT SERPL-MCNC: 6.9 G/DL (ref 6.4–8.2)
PROT UR-MCNC: 100 MG/DL
RBC # BLD AUTO: 5.09 X10(6)UL
SODIUM SERPL-SCNC: 139 MMOL/L (ref 136–145)
SP GR UR STRIP: 1.01 (ref 1–1.03)
T4 FREE SERPL-MCNC: 0.8 NG/DL (ref 0.8–1.7)
TRIGL SERPL-MCNC: 385 MG/DL (ref 30–149)
TSI SER-ACNC: 0.77 MIU/ML (ref 0.36–3.74)
UROBILINOGEN UR STRIP-ACNC: NORMAL
VIT D+METAB SERPL-MCNC: 23 NG/ML (ref 30–100)
VLDLC SERPL CALC-MCNC: 68 MG/DL (ref 0–30)
WBC # BLD AUTO: 11.6 X10(3) UL (ref 4–11)

## 2023-08-08 PROCEDURE — 85025 COMPLETE CBC W/AUTO DIFF WBC: CPT

## 2023-08-08 PROCEDURE — 83036 HEMOGLOBIN GLYCOSYLATED A1C: CPT

## 2023-08-08 PROCEDURE — 36415 COLL VENOUS BLD VENIPUNCTURE: CPT

## 2023-08-08 PROCEDURE — 80053 COMPREHEN METABOLIC PANEL: CPT

## 2023-08-08 PROCEDURE — 84439 ASSAY OF FREE THYROXINE: CPT

## 2023-08-08 PROCEDURE — 80061 LIPID PANEL: CPT

## 2023-08-08 PROCEDURE — 84443 ASSAY THYROID STIM HORMONE: CPT

## 2023-08-08 PROCEDURE — 82306 VITAMIN D 25 HYDROXY: CPT

## 2023-08-08 PROCEDURE — 81001 URINALYSIS AUTO W/SCOPE: CPT

## 2023-08-11 DIAGNOSIS — N28.9 ABNORMAL KIDNEY FUNCTION: Primary | ICD-10-CM

## 2023-08-24 DIAGNOSIS — F41.1 GENERALIZED ANXIETY DISORDER: ICD-10-CM

## 2023-08-25 NOTE — TELEPHONE ENCOUNTER
Please review. Protocol failed / No Protocol.     Requested Prescriptions   Pending Prescriptions Disp Refills    CLONAZEPAM 0.5 MG Oral Tab [Pharmacy Med Name: CLONAZEPAM 0.5MG TABLETS] 60 tablet 0     Sig: TAKE 1 TABLET(0.5 MG) BY MOUTH TWICE DAILY AS NEEDED FOR ANXIETY       There is no refill protocol information for this order

## 2023-08-27 RX ORDER — CLONAZEPAM 0.5 MG/1
0.5 TABLET ORAL 2 TIMES DAILY PRN
Qty: 60 TABLET | Refills: 0 | Status: SHIPPED | OUTPATIENT
Start: 2023-08-27

## 2023-10-22 ENCOUNTER — APPOINTMENT (OUTPATIENT)
Dept: GENERAL RADIOLOGY | Facility: HOSPITAL | Age: 53
DRG: 309 | End: 2023-10-22
Attending: EMERGENCY MEDICINE

## 2023-10-22 ENCOUNTER — HOSPITAL ENCOUNTER (INPATIENT)
Facility: HOSPITAL | Age: 53
LOS: 1 days | Discharge: HOME | DRG: 309 | End: 2023-10-23
Attending: EMERGENCY MEDICINE | Admitting: HOSPITALIST

## 2023-10-22 DIAGNOSIS — I48.91 ATRIAL FIBRILLATION WITH RVR (HCC): Primary | ICD-10-CM

## 2023-10-22 DIAGNOSIS — R07.9 CHEST PAIN OF UNCERTAIN ETIOLOGY: ICD-10-CM

## 2023-10-22 LAB
ANION GAP SERPL CALC-SCNC: 5 MMOL/L (ref 0–18)
BASOPHILS # BLD AUTO: 0.07 X10(3) UL (ref 0–0.2)
BASOPHILS NFR BLD AUTO: 0.5 %
BUN BLD-MCNC: 27 MG/DL (ref 7–18)
BUN/CREAT SERPL: 15.3 (ref 10–20)
CALCIUM BLD-MCNC: 8.4 MG/DL (ref 8.5–10.1)
CHLORIDE SERPL-SCNC: 114 MMOL/L (ref 98–112)
CO2 SERPL-SCNC: 23 MMOL/L (ref 21–32)
CREAT BLD-MCNC: 1.77 MG/DL
D DIMER PPP FEU-MCNC: 0.39 UG/ML FEU (ref ?–0.55)
DEPRECATED RDW RBC AUTO: 41.2 FL (ref 35.1–46.3)
EGFRCR SERPLBLD CKD-EPI 2021: 45 ML/MIN/1.73M2 (ref 60–?)
EOSINOPHIL # BLD AUTO: 0.42 X10(3) UL (ref 0–0.7)
EOSINOPHIL NFR BLD AUTO: 3.2 %
ERYTHROCYTE [DISTWIDTH] IN BLOOD BY AUTOMATED COUNT: 11.9 % (ref 11–15)
GLUCOSE BLD-MCNC: 111 MG/DL (ref 70–99)
HCT VFR BLD AUTO: 48.4 %
HGB BLD-MCNC: 16.8 G/DL
IMM GRANULOCYTES # BLD AUTO: 0.06 X10(3) UL (ref 0–1)
IMM GRANULOCYTES NFR BLD: 0.5 %
LYMPHOCYTES # BLD AUTO: 3.15 X10(3) UL (ref 1–4)
LYMPHOCYTES NFR BLD AUTO: 24.4 %
MAGNESIUM SERPL-MCNC: 1.9 MG/DL (ref 1.6–2.6)
MCH RBC QN AUTO: 32.9 PG (ref 26–34)
MCHC RBC AUTO-ENTMCNC: 34.7 G/DL (ref 31–37)
MCV RBC AUTO: 94.7 FL
MONOCYTES # BLD AUTO: 0.97 X10(3) UL (ref 0.1–1)
MONOCYTES NFR BLD AUTO: 7.5 %
NEUTROPHILS # BLD AUTO: 8.26 X10 (3) UL (ref 1.5–7.7)
NEUTROPHILS # BLD AUTO: 8.26 X10(3) UL (ref 1.5–7.7)
NEUTROPHILS NFR BLD AUTO: 63.9 %
OSMOLALITY SERPL CALC.SUM OF ELEC: 300 MOSM/KG (ref 275–295)
PLATELET # BLD AUTO: 180 10(3)UL (ref 150–450)
POTASSIUM SERPL-SCNC: 4.3 MMOL/L (ref 3.5–5.1)
Q-T INTERVAL: 288 MS
QRS DURATION: 90 MS
QTC CALCULATION (BEZET): 452 MS
R AXIS: 14 DEGREES
RBC # BLD AUTO: 5.11 X10(6)UL
SODIUM SERPL-SCNC: 142 MMOL/L (ref 136–145)
T AXIS: 41 DEGREES
TROPONIN I HIGH SENSITIVITY: 20 NG/L
TROPONIN I HIGH SENSITIVITY: 48 NG/L
TROPONIN I HIGH SENSITIVITY: 49 NG/L
TSI SER-ACNC: 0.68 MIU/ML (ref 0.36–3.74)
VENTRICULAR RATE: 148 BPM
WBC # BLD AUTO: 12.9 X10(3) UL (ref 4–11)

## 2023-10-22 PROCEDURE — 99222 1ST HOSP IP/OBS MODERATE 55: CPT | Performed by: HOSPITALIST

## 2023-10-22 PROCEDURE — 71045 X-RAY EXAM CHEST 1 VIEW: CPT | Performed by: EMERGENCY MEDICINE

## 2023-10-22 RX ORDER — MORPHINE SULFATE 2 MG/ML
1 INJECTION, SOLUTION INTRAMUSCULAR; INTRAVENOUS EVERY 2 HOUR PRN
Status: DISCONTINUED | OUTPATIENT
Start: 2023-10-22 | End: 2023-10-23

## 2023-10-22 RX ORDER — ACETAMINOPHEN 500 MG
500 TABLET ORAL EVERY 4 HOURS PRN
Status: DISCONTINUED | OUTPATIENT
Start: 2023-10-22 | End: 2023-10-23

## 2023-10-22 RX ORDER — METOPROLOL TARTRATE 5 MG/5ML
2.5 INJECTION INTRAVENOUS ONCE
Status: COMPLETED | OUTPATIENT
Start: 2023-10-22 | End: 2023-10-22

## 2023-10-22 RX ORDER — PANTOPRAZOLE SODIUM 40 MG/1
40 TABLET, DELAYED RELEASE ORAL
Status: DISCONTINUED | OUTPATIENT
Start: 2023-10-22 | End: 2023-10-22

## 2023-10-22 RX ORDER — ONDANSETRON 2 MG/ML
4 INJECTION INTRAMUSCULAR; INTRAVENOUS EVERY 6 HOURS PRN
Status: DISCONTINUED | OUTPATIENT
Start: 2023-10-22 | End: 2023-10-23

## 2023-10-22 RX ORDER — CLONAZEPAM 0.5 MG/1
0.5 TABLET ORAL 2 TIMES DAILY PRN
Status: DISCONTINUED | OUTPATIENT
Start: 2023-10-22 | End: 2023-10-23

## 2023-10-22 RX ORDER — PROCHLORPERAZINE EDISYLATE 5 MG/ML
5 INJECTION INTRAMUSCULAR; INTRAVENOUS EVERY 8 HOURS PRN
Status: DISCONTINUED | OUTPATIENT
Start: 2023-10-22 | End: 2023-10-23

## 2023-10-22 RX ORDER — MORPHINE SULFATE 2 MG/ML
INJECTION, SOLUTION INTRAMUSCULAR; INTRAVENOUS
Status: DISPENSED
Start: 2023-10-22 | End: 2023-10-23

## 2023-10-22 RX ORDER — DILTIAZEM HYDROCHLORIDE 5 MG/ML
10 INJECTION INTRAVENOUS ONCE
Status: COMPLETED | OUTPATIENT
Start: 2023-10-22 | End: 2023-10-22

## 2023-10-22 RX ORDER — ATORVASTATIN CALCIUM 40 MG/1
40 TABLET, FILM COATED ORAL NIGHTLY
Status: DISCONTINUED | OUTPATIENT
Start: 2023-10-22 | End: 2023-10-23

## 2023-10-22 RX ORDER — CALCIUM CARBONATE 500 MG/1
1000 TABLET, CHEWABLE ORAL 3 TIMES DAILY PRN
Status: DISCONTINUED | OUTPATIENT
Start: 2023-10-22 | End: 2023-10-23

## 2023-10-22 RX ORDER — PANTOPRAZOLE SODIUM 40 MG/1
40 TABLET, DELAYED RELEASE ORAL
Status: DISCONTINUED | OUTPATIENT
Start: 2023-10-23 | End: 2023-10-23

## 2023-10-22 RX ORDER — DILTIAZEM HYDROCHLORIDE 5 MG/ML
10 INJECTION INTRAVENOUS ONCE
Status: DISCONTINUED | OUTPATIENT
Start: 2023-10-22 | End: 2023-10-22

## 2023-10-22 RX ORDER — MORPHINE SULFATE 2 MG/ML
2 INJECTION, SOLUTION INTRAMUSCULAR; INTRAVENOUS EVERY 2 HOUR PRN
Status: DISCONTINUED | OUTPATIENT
Start: 2023-10-22 | End: 2023-10-23

## 2023-10-22 RX ORDER — DIGOXIN 0.25 MG/ML
125 INJECTION INTRAMUSCULAR; INTRAVENOUS ONCE
Status: COMPLETED | OUTPATIENT
Start: 2023-10-22 | End: 2023-10-22

## 2023-10-22 RX ORDER — MORPHINE SULFATE 4 MG/ML
4 INJECTION, SOLUTION INTRAMUSCULAR; INTRAVENOUS EVERY 2 HOUR PRN
Status: DISCONTINUED | OUTPATIENT
Start: 2023-10-22 | End: 2023-10-23

## 2023-10-22 RX ORDER — POLYETHYLENE GLYCOL 3350 17 G/17G
17 POWDER, FOR SOLUTION ORAL DAILY PRN
Status: DISCONTINUED | OUTPATIENT
Start: 2023-10-22 | End: 2023-10-23

## 2023-10-22 RX ORDER — HEPARIN SODIUM 5000 [USP'U]/ML
5000 INJECTION, SOLUTION INTRAVENOUS; SUBCUTANEOUS EVERY 12 HOURS SCHEDULED
Status: DISCONTINUED | OUTPATIENT
Start: 2023-10-22 | End: 2023-10-22

## 2023-10-22 NOTE — ED INITIAL ASSESSMENT (HPI)
Patient arrives ambulatory through triage c/o intense chest pain, keeps on verbalizing \" I'm dying, I'm having a heart attack. \"   Reports checking heart rate with fluctuating between 80s-120s.       Denies N/V, SOB

## 2023-10-22 NOTE — ED QUICK NOTES
Orders for admission, patient is aware of plan and ready to go upstairs. Any questions, please call ED RN sheri at extension [phone].      Patient Covid vaccination status: Unvaccinated     COVID Test Ordered in ED: None    COVID Suspicion at Admission: N/A    Running Infusions:    dilTIAZem 5 mg/hr (10/22/23 1018)        Mental Status/LOC at time of transport: AXOX4    Other pertinent information:   CIWA score: N/A   NIH score:  N/A

## 2023-10-23 ENCOUNTER — APPOINTMENT (OUTPATIENT)
Dept: NUCLEAR MEDICINE | Facility: HOSPITAL | Age: 53
DRG: 309 | End: 2023-10-23
Attending: HOSPITALIST

## 2023-10-23 ENCOUNTER — APPOINTMENT (OUTPATIENT)
Dept: CV DIAGNOSTICS | Facility: HOSPITAL | Age: 53
DRG: 309 | End: 2023-10-23
Attending: HOSPITALIST

## 2023-10-23 VITALS
HEIGHT: 64 IN | BODY MASS INDEX: 35.05 KG/M2 | WEIGHT: 205.31 LBS | SYSTOLIC BLOOD PRESSURE: 121 MMHG | OXYGEN SATURATION: 93 % | RESPIRATION RATE: 16 BRPM | TEMPERATURE: 98 F | HEART RATE: 89 BPM | DIASTOLIC BLOOD PRESSURE: 98 MMHG

## 2023-10-23 LAB
% OF MAX PREDICTED HR: 100 %
ANION GAP SERPL CALC-SCNC: 5 MMOL/L (ref 0–18)
BASOPHILS # BLD AUTO: 0.07 X10(3) UL (ref 0–0.2)
BASOPHILS NFR BLD AUTO: 0.5 %
BUN BLD-MCNC: 36 MG/DL (ref 7–18)
BUN/CREAT SERPL: 17.3 (ref 10–20)
CALCIUM BLD-MCNC: 8 MG/DL (ref 8.5–10.1)
CHLORIDE SERPL-SCNC: 113 MMOL/L (ref 98–112)
CO2 SERPL-SCNC: 21 MMOL/L (ref 21–32)
CREAT BLD-MCNC: 2.08 MG/DL
DEPRECATED RDW RBC AUTO: 43.4 FL (ref 35.1–46.3)
EGFRCR SERPLBLD CKD-EPI 2021: 37 ML/MIN/1.73M2 (ref 60–?)
EOSINOPHIL # BLD AUTO: 0.45 X10(3) UL (ref 0–0.7)
EOSINOPHIL NFR BLD AUTO: 3.4 %
ERYTHROCYTE [DISTWIDTH] IN BLOOD BY AUTOMATED COUNT: 12.2 % (ref 11–15)
GLUCOSE BLD-MCNC: 136 MG/DL (ref 70–99)
HCT VFR BLD AUTO: 48.7 %
HGB BLD-MCNC: 16.8 G/DL
IMM GRANULOCYTES # BLD AUTO: 0.07 X10(3) UL (ref 0–1)
IMM GRANULOCYTES NFR BLD: 0.5 %
LYMPHOCYTES # BLD AUTO: 3.38 X10(3) UL (ref 1–4)
LYMPHOCYTES NFR BLD AUTO: 25.7 %
MAX DIASTOLIC BP: 82 MMHG
MAX HEART RATE: 99 BPM
MAX PREDICTED HEART RATE: 165 BPM
MAX SYSTOLIC BP: 117 MMHG
MAX WORK LOAD: 10
MCH RBC QN AUTO: 33.5 PG (ref 26–34)
MCHC RBC AUTO-ENTMCNC: 34.5 G/DL (ref 31–37)
MCV RBC AUTO: 97 FL
MONOCYTES # BLD AUTO: 0.84 X10(3) UL (ref 0.1–1)
MONOCYTES NFR BLD AUTO: 6.4 %
NEUTROPHILS # BLD AUTO: 8.34 X10 (3) UL (ref 1.5–7.7)
NEUTROPHILS # BLD AUTO: 8.34 X10(3) UL (ref 1.5–7.7)
NEUTROPHILS NFR BLD AUTO: 63.5 %
OSMOLALITY SERPL CALC.SUM OF ELEC: 298 MOSM/KG (ref 275–295)
PLATELET # BLD AUTO: 173 10(3)UL (ref 150–450)
POTASSIUM SERPL-SCNC: 4.6 MMOL/L (ref 3.5–5.1)
RBC # BLD AUTO: 5.02 X10(6)UL
SODIUM SERPL-SCNC: 139 MMOL/L (ref 136–145)
WBC # BLD AUTO: 13.2 X10(3) UL (ref 4–11)

## 2023-10-23 PROCEDURE — 99239 HOSP IP/OBS DSCHRG MGMT >30: CPT | Performed by: HOSPITALIST

## 2023-10-23 PROCEDURE — 93017 CV STRESS TEST TRACING ONLY: CPT | Performed by: HOSPITALIST

## 2023-10-23 PROCEDURE — 78452 HT MUSCLE IMAGE SPECT MULT: CPT | Performed by: HOSPITALIST

## 2023-10-23 PROCEDURE — 93306 TTE W/DOPPLER COMPLETE: CPT | Performed by: HOSPITALIST

## 2023-10-23 PROCEDURE — 99233 SBSQ HOSP IP/OBS HIGH 50: CPT | Performed by: HOSPITALIST

## 2023-10-23 RX ORDER — DILTIAZEM HYDROCHLORIDE 5 MG/ML
5 INJECTION INTRAVENOUS ONCE
Status: COMPLETED | OUTPATIENT
Start: 2023-10-23 | End: 2023-10-23

## 2023-10-23 RX ORDER — DIGOXIN 125 MCG
250 TABLET ORAL DAILY
Status: DISCONTINUED | OUTPATIENT
Start: 2023-10-23 | End: 2023-10-23

## 2023-10-23 RX ORDER — REGADENOSON 0.08 MG/ML
INJECTION, SOLUTION INTRAVENOUS
Status: COMPLETED
Start: 2023-10-23 | End: 2023-10-23

## 2023-10-23 RX ORDER — PANTOPRAZOLE SODIUM 40 MG/1
40 TABLET, DELAYED RELEASE ORAL
Qty: 60 TABLET | Refills: 0 | Status: SHIPPED | OUTPATIENT
Start: 2023-10-23

## 2023-10-23 RX ORDER — METOPROLOL TARTRATE 50 MG/1
50 TABLET, FILM COATED ORAL
Status: DISCONTINUED | OUTPATIENT
Start: 2023-10-23 | End: 2023-10-23

## 2023-10-23 RX ORDER — DIGOXIN 125 MCG
125 TABLET ORAL DAILY
Status: DISCONTINUED | OUTPATIENT
Start: 2023-10-23 | End: 2023-10-23

## 2023-10-23 RX ORDER — DIGOXIN 0.25 MG/ML
125 INJECTION INTRAMUSCULAR; INTRAVENOUS ONCE
Status: COMPLETED | OUTPATIENT
Start: 2023-10-23 | End: 2023-10-23

## 2023-10-23 RX ORDER — METOPROLOL TARTRATE 50 MG/1
50 TABLET, FILM COATED ORAL
Qty: 60 TABLET | Refills: 3 | Status: SHIPPED | OUTPATIENT
Start: 2023-10-23

## 2023-10-23 RX ORDER — DIGOXIN 125 MCG
125 TABLET ORAL DAILY
Qty: 30 TABLET | Refills: 0 | Status: SHIPPED | OUTPATIENT
Start: 2023-10-24

## 2023-10-23 NOTE — PLAN OF CARE
Was admitted from er to 26 @1340. Recd w/cardizem drip removed. Admission completed, hr afib ranging 110's-150's ludwig w/activity, pain, anxiety. Mendel rolon cardiology updated, dr king updated, see orders, notes, assessment, no cardizem drip at this time, given ordered meds, overall is feeling better, cont to monitor & maintain comfort & safety. Problem: Patient Centered Care  Goal: Patient preferences are identified and integrated in the patient's plan of care  Description: Interventions:  - What would you like us to know as we care for you?  Dgtr lives w/pt  - Provide timely, complete, and accurate information to patient/family  - Incorporate patient and family knowledge, values, beliefs, and cultural backgrounds into the planning and delivery of care  - Encourage patient/family to participate in care and decision-making at the level they choose  - Honor patient and family perspectives and choices  Outcome: Progressing

## 2023-10-23 NOTE — PLAN OF CARE
Pt alert and oriented x4. Pt on room air. PO dig started per cardiology. Pt completed stress test and echo today. Pt ambulating per self. Problem: Patient Centered Care  Goal: Patient preferences are identified and integrated in the patient's plan of care  Description: Interventions:  - What would you like us to know as we care for you? From home with children  - Provide timely, complete, and accurate information to patient/family  - Incorporate patient and family knowledge, values, beliefs, and cultural backgrounds into the planning and delivery of care  - Encourage patient/family to participate in care and decision-making at the level they choose  - Honor patient and family perspectives and choices  Outcome: Progressing     Problem: Patient/Family Goals  Goal: Patient/Family Long Term Goal  Description: Patient's Long Term Goal: Return home, stay healthy  Interventions:  -monitor vs, assess pain, tele monitor  - See additional Care Plan goals for specific interventions  Outcome: Progressing  Goal: Patient/Family Short Term Goal  Description: Patient's Short Term Goal: Manage pain/hr    Interventions:   - medications per orders, assess chest pain, stress test.   - See additional Care Plan goals for specific interventions  Outcome: Progressing     Problem: CARDIOVASCULAR - ADULT  Goal: Maintains optimal cardiac output and hemodynamic stability  Description: INTERVENTIONS:  - Monitor vital signs, rhythm, and trends  - Monitor for bleeding, hypotension and signs of decreased cardiac output  - Evaluate effectiveness of vasoactive medications to optimize hemodynamic stability  - Monitor arterial and/or venous puncture sites for bleeding and/or hematoma  - Assess quality of pulses, skin color and temperature  - Assess for signs of decreased coronary artery perfusion - ex.  Angina  - Evaluate fluid balance, assess for edema, trend weights  Outcome: Progressing  Goal: Absence of cardiac arrhythmias or at baseline  Description: INTERVENTIONS:  - Continuous cardiac monitoring, monitor vital signs, obtain 12 lead EKG if indicated  - Evaluate effectiveness of antiarrhythmic and heart rate control medications as ordered  - Initiate emergency measures for life threatening arrhythmias  - Monitor electrolytes and administer replacement therapy as ordered  Outcome: Progressing     Problem: RESPIRATORY - ADULT  Goal: Achieves optimal ventilation and oxygenation  Description: INTERVENTIONS:  - Assess for changes in respiratory status  - Assess for changes in mentation and behavior  - Position to facilitate oxygenation and minimize respiratory effort  - Oxygen supplementation based on oxygen saturation or ABGs  - Provide Smoking Cessation handout, if applicable  - Encourage broncho-pulmonary hygiene including cough, deep breathe, Incentive Spirometry  - Assess the need for suctioning and perform as needed  - Assess and instruct to report SOB or any respiratory difficulty  - Respiratory Therapy support as indicated  - Manage/alleviate anxiety  - Monitor for signs/symptoms of CO2 retention  Outcome: Progressing     Problem: PAIN - ADULT  Goal: Verbalizes/displays adequate comfort level or patient's stated pain goal  Description: INTERVENTIONS:  - Encourage pt to monitor pain and request assistance  - Assess pain using appropriate pain scale  - Administer analgesics based on type and severity of pain and evaluate response  - Implement non-pharmacological measures as appropriate and evaluate response  - Consider cultural and social influences on pain and pain management  - Manage/alleviate anxiety  - Utilize distraction and/or relaxation techniques  - Monitor for opioid side effects  - Notify MD/LIP if interventions unsuccessful or patient reports new pain  - Anticipate increased pain with activity and pre-medicate as appropriate  Outcome: Progressing     Problem: SAFETY ADULT - FALL  Goal: Free from fall injury  Description: INTERVENTIONS:  - Assess pt frequently for physical needs  - Identify cognitive and physical deficits and behaviors that affect risk of falls.   - [place] fall precautions as indicated by assessment.  - Educate pt/family on patient safety including physical limitations  - Instruct pt to call for assistance with activity based on assessment  - Modify environment to reduce risk of injury  - Provide assistive devices as appropriate  - Consider OT/PT consult to assist with strengthening/mobility  - Encourage toileting schedule  Outcome: Progressing     Problem: DISCHARGE PLANNING  Goal: Discharge to home or other facility with appropriate resources  Description: INTERVENTIONS:  - Identify barriers to discharge w/pt and caregiver  - Include patient/family/discharge partner in discharge planning  - Arrange for needed discharge resources and transportation as appropriate  - Identify discharge learning needs (meds, wound care, etc)  - Arrange for interpreters to assist at discharge as needed  - Consider post-discharge preferences of patient/family/discharge partner  - Complete POLST form as appropriate  - Assess patient's ability to be responsible for managing their own health  - Refer to Case Management Department for coordinating discharge planning if the patient needs post-hospital services based on physician/LIP order or complex needs related to functional status, cognitive ability or social support system  Outcome: Progressing

## 2023-10-23 NOTE — PLAN OF CARE
Plan for pt to dc home with daughter. Primary RN educated pt on anticoagulation. Discharge RN went over discharge instructions. Pt ambulating ad christina. Problem: Patient Centered Care  Goal: Patient preferences are identified and integrated in the patient's plan of care  Description: Interventions:  - What would you like us to know as we care for you?  From home with children  - Provide timely, complete, and accurate information to patient/family  - Incorporate patient and family knowledge, values, beliefs, and cultural backgrounds into the planning and delivery of care  - Encourage patient/family to participate in care and decision-making at the level they choose  - Honor patient and family perspectives and choices  10/23/2023 1702 by Case Correa  Outcome: Completed  10/23/2023 1018 by Case Correa  Outcome: Progressing     Problem: Patient/Family Goals  Goal: Patient/Family Long Term Goal  Description: Patient's Long Term Goal: Return home, stay healthy  Interventions:  -monitor vs, assess pain, tele monitor  - See additional Care Plan goals for specific interventions  10/23/2023 1702 by Case Correa  Outcome: Completed  10/23/2023 1018 by Case Correa  Outcome: Progressing  Goal: Patient/Family Short Term Goal  Description: Patient's Short Term Goal: Manage pain/hr    Interventions:   - medications per orders, assess chest pain, stress test.   - See additional Care Plan goals for specific interventions  10/23/2023 1702 by Case Correa  Outcome: Completed  10/23/2023 1018 by Case Correa  Outcome: Progressing     Problem: CARDIOVASCULAR - ADULT  Goal: Maintains optimal cardiac output and hemodynamic stability  Description: INTERVENTIONS:  - Monitor vital signs, rhythm, and trends  - Monitor for bleeding, hypotension and signs of decreased cardiac output  - Evaluate effectiveness of vasoactive medications to optimize hemodynamic stability  - Monitor arterial and/or venous puncture sites for bleeding and/or hematoma  - Assess quality of pulses, skin color and temperature  - Assess for signs of decreased coronary artery perfusion - ex.  Angina  - Evaluate fluid balance, assess for edema, trend weights  10/23/2023 1702 by Jeet Hill  Outcome: Completed  10/23/2023 1018 by Jeet Hill  Outcome: Progressing  Goal: Absence of cardiac arrhythmias or at baseline  Description: INTERVENTIONS:  - Continuous cardiac monitoring, monitor vital signs, obtain 12 lead EKG if indicated  - Evaluate effectiveness of antiarrhythmic and heart rate control medications as ordered  - Initiate emergency measures for life threatening arrhythmias  - Monitor electrolytes and administer replacement therapy as ordered  10/23/2023 1702 by Jeet Hill  Outcome: Completed  10/23/2023 1018 by Jeet Hlil  Outcome: Progressing     Problem: RESPIRATORY - ADULT  Goal: Achieves optimal ventilation and oxygenation  Description: INTERVENTIONS:  - Assess for changes in respiratory status  - Assess for changes in mentation and behavior  - Position to facilitate oxygenation and minimize respiratory effort  - Oxygen supplementation based on oxygen saturation or ABGs  - Provide Smoking Cessation handout, if applicable  - Encourage broncho-pulmonary hygiene including cough, deep breathe, Incentive Spirometry  - Assess the need for suctioning and perform as needed  - Assess and instruct to report SOB or any respiratory difficulty  - Respiratory Therapy support as indicated  - Manage/alleviate anxiety  - Monitor for signs/symptoms of CO2 retention  10/23/2023 1702 by Jeet Hill  Outcome: Completed  10/23/2023 1018 by Jeet Hill  Outcome: Progressing     Problem: PAIN - ADULT  Goal: Verbalizes/displays adequate comfort level or patient's stated pain goal  Description: INTERVENTIONS:  - Encourage pt to monitor pain and request assistance  - Assess pain using appropriate pain scale  - Administer analgesics based on type and severity of pain and evaluate response  - Implement non-pharmacological measures as appropriate and evaluate response  - Consider cultural and social influences on pain and pain management  - Manage/alleviate anxiety  - Utilize distraction and/or relaxation techniques  - Monitor for opioid side effects  - Notify MD/LIP if interventions unsuccessful or patient reports new pain  - Anticipate increased pain with activity and pre-medicate as appropriate  10/23/2023 1702 by Lexis Del Castillo  Outcome: Completed  10/23/2023 1018 by Lexis Del Castillo  Outcome: Progressing     Problem: SAFETY ADULT - FALL  Goal: Free from fall injury  Description: INTERVENTIONS:  - Assess pt frequently for physical needs  - Identify cognitive and physical deficits and behaviors that affect risk of falls.   - [place] fall precautions as indicated by assessment.  - Educate pt/family on patient safety including physical limitations  - Instruct pt to call for assistance with activity based on assessment  - Modify environment to reduce risk of injury  - Provide assistive devices as appropriate  - Consider OT/PT consult to assist with strengthening/mobility  - Encourage toileting schedule  10/23/2023 1702 by Lexis Del Castillo  Outcome: Completed  10/23/2023 1018 by Lexis Del Castillo  Outcome: Progressing     Problem: DISCHARGE PLANNING  Goal: Discharge to home or other facility with appropriate resources  Description: INTERVENTIONS:  - Identify barriers to discharge w/pt and caregiver  - Include patient/family/discharge partner in discharge planning  - Arrange for needed discharge resources and transportation as appropriate  - Identify discharge learning needs (meds, wound care, etc)  - Arrange for interpreters to assist at discharge as needed  - Consider post-discharge preferences of patient/family/discharge partner  - Complete POLST form as appropriate  - Assess patient's ability to be responsible for managing their own health  - Refer to Case Management Department for coordinating discharge planning if the patient needs post-hospital services based on physician/LIP order or complex needs related to functional status, cognitive ability or social support system  10/23/2023 1702 by Kathia Figueroa  Outcome: Completed  10/23/2023 1018 by Kathia Figueroa  Outcome: Progressing

## 2023-10-23 NOTE — PROGRESS NOTES
Notified by RN pt's HR 130s-150s. Continues to be hypotensive with SBP in the 80s. Pt still sleeping per RN. Digoxin 125 mcg IVP x 1. 0.9  ml bolus also ordered.

## 2023-10-23 NOTE — PROGRESS NOTES
Notified by RN pt's HR still in the 130s after lopressor 2.5 mg IVP. RN also reports pt hypotensive with SBP in the 90s.  Digoxin 125 mcg IVP x 1 ordered,

## 2023-10-23 NOTE — DISCHARGE PLANNING
Patient was provided with discharge instructions, education, and follow up information. Prescriptions were already sent electronically to patient's pharmacy. Eliquis price checked by primary RN Laurinda Goldmann- $50 for 30 day supply. Eliquis coupon given to patient. Patient verbalizes understanding of follow up information, specifically cardiology. Patient has no questions after reviewing all instructions and will be going home.      Omid Nunez, Discharge Leader [id number]

## 2023-10-23 NOTE — PLAN OF CARE
AO4, RA. -150s Afib overnight. See orders and MAR. -110s Afib after IVP cardizem. No c/o pain. Independent in room. Ambulated in hallways. Plan: stress test (consent signed), 2D echo    Call light within reach, fall precautions in place  Problem: Patient Centered Care  Goal: Patient preferences are identified and integrated in the patient's plan of care  Description: Interventions:  - What would you like us to know as we care for you? From home with children  - Provide timely, complete, and accurate information to patient/family  - Incorporate patient and family knowledge, values, beliefs, and cultural backgrounds into the planning and delivery of care  - Encourage patient/family to participate in care and decision-making at the level they choose  - Honor patient and family perspectives and choices  Outcome: Progressing     Problem: CARDIOVASCULAR - ADULT  Goal: Maintains optimal cardiac output and hemodynamic stability  Description: INTERVENTIONS:  - Monitor vital signs, rhythm, and trends  - Monitor for bleeding, hypotension and signs of decreased cardiac output  - Evaluate effectiveness of vasoactive medications to optimize hemodynamic stability  - Monitor arterial and/or venous puncture sites for bleeding and/or hematoma  - Assess quality of pulses, skin color and temperature  - Assess for signs of decreased coronary artery perfusion - ex.  Angina  - Evaluate fluid balance, assess for edema, trend weights  Outcome: Progressing     Problem: RESPIRATORY - ADULT  Goal: Achieves optimal ventilation and oxygenation  Description: INTERVENTIONS:  - Assess for changes in respiratory status  - Assess for changes in mentation and behavior  - Position to facilitate oxygenation and minimize respiratory effort  - Oxygen supplementation based on oxygen saturation or ABGs  - Provide Smoking Cessation handout, if applicable  - Encourage broncho-pulmonary hygiene including cough, deep breathe, Incentive Spirometry  - Assess the need for suctioning and perform as needed  - Assess and instruct to report SOB or any respiratory difficulty  - Respiratory Therapy support as indicated  - Manage/alleviate anxiety  - Monitor for signs/symptoms of CO2 retention  Outcome: Progressing     Problem: PAIN - ADULT  Goal: Verbalizes/displays adequate comfort level or patient's stated pain goal  Description: INTERVENTIONS:  - Encourage pt to monitor pain and request assistance  - Assess pain using appropriate pain scale  - Administer analgesics based on type and severity of pain and evaluate response  - Implement non-pharmacological measures as appropriate and evaluate response  - Consider cultural and social influences on pain and pain management  - Manage/alleviate anxiety  - Utilize distraction and/or relaxation techniques  - Monitor for opioid side effects  - Notify MD/LIP if interventions unsuccessful or patient reports new pain  - Anticipate increased pain with activity and pre-medicate as appropriate  Outcome: Progressing     Problem: SAFETY ADULT - FALL  Goal: Free from fall injury  Description: INTERVENTIONS:  - Assess pt frequently for physical needs  - Identify cognitive and physical deficits and behaviors that affect risk of falls.   - [place] fall precautions as indicated by assessment.  - Educate pt/family on patient safety including physical limitations  - Instruct pt to call for assistance with activity based on assessment  - Modify environment to reduce risk of injury  - Provide assistive devices as appropriate  - Consider OT/PT consult to assist with strengthening/mobility  - Encourage toileting schedule  Outcome: Progressing     Problem: DISCHARGE PLANNING  Goal: Discharge to home or other facility with appropriate resources  Description: INTERVENTIONS:  - Identify barriers to discharge w/pt and caregiver  - Include patient/family/discharge partner in discharge planning  - Arrange for needed discharge resources and transportation as appropriate  - Identify discharge learning needs (meds, wound care, etc)  - Arrange for interpreters to assist at discharge as needed  - Consider post-discharge preferences of patient/family/discharge partner  - Complete POLST form as appropriate  - Assess patient's ability to be responsible for managing their own health  - Refer to Case Management Department for coordinating discharge planning if the patient needs post-hospital services based on physician/LIP order or complex needs related to functional status, cognitive ability or social support system  Outcome: Progressing     Problem: CARDIOVASCULAR - ADULT  Goal: Absence of cardiac arrhythmias or at baseline  Description: INTERVENTIONS:  - Continuous cardiac monitoring, monitor vital signs, obtain 12 lead EKG if indicated  - Evaluate effectiveness of antiarrhythmic and heart rate control medications as ordered  - Initiate emergency measures for life threatening arrhythmias  - Monitor electrolytes and administer replacement therapy as ordered  Outcome: Not Progressing

## 2023-10-24 ENCOUNTER — PATIENT OUTREACH (OUTPATIENT)
Dept: CASE MANAGEMENT | Age: 53
End: 2023-10-24

## 2023-10-24 ENCOUNTER — TELEPHONE (OUTPATIENT)
Dept: INTERNAL MEDICINE CLINIC | Facility: CLINIC | Age: 53
End: 2023-10-24

## 2023-10-24 DIAGNOSIS — R07.9 CHEST PAIN OF UNCERTAIN ETIOLOGY: ICD-10-CM

## 2023-10-24 DIAGNOSIS — I50.32 CHRONIC DIASTOLIC (CONGESTIVE) HEART FAILURE (HCC): ICD-10-CM

## 2023-10-24 DIAGNOSIS — N18.30 STAGE 3 CHRONIC KIDNEY DISEASE, UNSPECIFIED WHETHER STAGE 3A OR 3B CKD (HCC): ICD-10-CM

## 2023-10-24 DIAGNOSIS — I48.91 ATRIAL FIBRILLATION WITH RAPID VENTRICULAR RESPONSE (HCC): ICD-10-CM

## 2023-10-24 DIAGNOSIS — F17.200 SMOKER: ICD-10-CM

## 2023-10-24 DIAGNOSIS — Z02.9 ENCOUNTERS FOR UNSPECIFIED ADMINISTRATIVE PURPOSE: Primary | ICD-10-CM

## 2023-10-24 PROCEDURE — 1111F DSCHRG MED/CURRENT MED MERGE: CPT

## 2023-10-24 NOTE — TELEPHONE ENCOUNTER
Spoke to patient for TCM today. Patient does not have an appointment scheduled at this time. TCM appointment recommended by 11/6/2023 as patient is at risk for readmission. Please advise. NCM attempted to schedule a TCM appointment, patient is requesting a morning appointment, as he works in the afternoons. Patient is not able to do a virtual visit. Due to MD's full schedule message sent to MD's office for assistance scheduling the appointment. BOOK BY DATE (last date for TCM): 11/6/2023    Clinical staff:  Please notify Dr Gunjan Ngo of the above to advise when she can see the patient. Then please call the patient and try to get them to schedule the TCM appointment within the TCM timeframe. Thank you!

## 2023-10-26 NOTE — TELEPHONE ENCOUNTER
Patient scheduled for Nov 1 at 1pm.. Eliot Back Just an fyi patient says he would have to leave by 1:30pm that day because he has to be at work by 2pm. That was the closest time and day that worked for him.

## 2023-11-01 ENCOUNTER — OFFICE VISIT (OUTPATIENT)
Dept: INTERNAL MEDICINE CLINIC | Facility: CLINIC | Age: 53
End: 2023-11-01
Payer: COMMERCIAL

## 2023-11-01 VITALS
SYSTOLIC BLOOD PRESSURE: 108 MMHG | HEIGHT: 64 IN | DIASTOLIC BLOOD PRESSURE: 72 MMHG | BODY MASS INDEX: 34.15 KG/M2 | WEIGHT: 200 LBS | HEART RATE: 68 BPM

## 2023-11-01 DIAGNOSIS — I50.812 CHRONIC RIGHT-SIDED CONGESTIVE HEART FAILURE (HCC): ICD-10-CM

## 2023-11-01 DIAGNOSIS — I48.0 PAROXYSMAL ATRIAL FIBRILLATION (HCC): ICD-10-CM

## 2023-11-01 DIAGNOSIS — E55.9 VITAMIN D DEFICIENCY: ICD-10-CM

## 2023-11-01 DIAGNOSIS — E78.49 OTHER HYPERLIPIDEMIA: ICD-10-CM

## 2023-11-01 DIAGNOSIS — I10 ESSENTIAL HYPERTENSION: Primary | ICD-10-CM

## 2023-11-01 DIAGNOSIS — F41.1 GENERALIZED ANXIETY DISORDER: ICD-10-CM

## 2023-11-01 PROBLEM — I48.91 ATRIAL FIBRILLATION WITH RVR (HCC): Status: RESOLVED | Noted: 2023-10-22 | Resolved: 2023-11-01

## 2023-11-01 PROCEDURE — 3078F DIAST BP <80 MM HG: CPT | Performed by: INTERNAL MEDICINE

## 2023-11-01 PROCEDURE — 3008F BODY MASS INDEX DOCD: CPT | Performed by: INTERNAL MEDICINE

## 2023-11-01 PROCEDURE — 99495 TRANSJ CARE MGMT MOD F2F 14D: CPT | Performed by: INTERNAL MEDICINE

## 2023-11-01 PROCEDURE — 3074F SYST BP LT 130 MM HG: CPT | Performed by: INTERNAL MEDICINE

## 2023-11-07 ENCOUNTER — LAB ENCOUNTER (OUTPATIENT)
Dept: LAB | Age: 53
End: 2023-11-07
Attending: NURSE PRACTITIONER
Payer: COMMERCIAL

## 2023-11-07 ENCOUNTER — HOSPITAL ENCOUNTER (OUTPATIENT)
Dept: GENERAL RADIOLOGY | Age: 53
Discharge: HOME | End: 2023-11-07
Attending: NURSE PRACTITIONER
Payer: COMMERCIAL

## 2023-11-07 ENCOUNTER — OFFICE VISIT (OUTPATIENT)
Dept: INTERNAL MEDICINE CLINIC | Facility: CLINIC | Age: 53
End: 2023-11-07

## 2023-11-07 VITALS
HEIGHT: 64 IN | DIASTOLIC BLOOD PRESSURE: 72 MMHG | HEART RATE: 62 BPM | BODY MASS INDEX: 34.49 KG/M2 | WEIGHT: 202 LBS | SYSTOLIC BLOOD PRESSURE: 109 MMHG

## 2023-11-07 DIAGNOSIS — M79.671 RIGHT FOOT PAIN: ICD-10-CM

## 2023-11-07 DIAGNOSIS — M79.671 RIGHT FOOT PAIN: Primary | ICD-10-CM

## 2023-11-07 LAB — URATE SERPL-MCNC: 9 MG/DL

## 2023-11-07 PROCEDURE — 3008F BODY MASS INDEX DOCD: CPT | Performed by: NURSE PRACTITIONER

## 2023-11-07 PROCEDURE — 99213 OFFICE O/P EST LOW 20 MIN: CPT | Performed by: NURSE PRACTITIONER

## 2023-11-07 PROCEDURE — 3074F SYST BP LT 130 MM HG: CPT | Performed by: NURSE PRACTITIONER

## 2023-11-07 PROCEDURE — 3078F DIAST BP <80 MM HG: CPT | Performed by: NURSE PRACTITIONER

## 2023-11-07 PROCEDURE — 73620 X-RAY EXAM OF FOOT: CPT | Performed by: NURSE PRACTITIONER

## 2023-11-07 PROCEDURE — 84550 ASSAY OF BLOOD/URIC ACID: CPT

## 2023-11-07 PROCEDURE — 36415 COLL VENOUS BLD VENIPUNCTURE: CPT

## 2024-01-09 ENCOUNTER — OFFICE VISIT (OUTPATIENT)
Dept: OTOLARYNGOLOGY | Facility: CLINIC | Age: 54
End: 2024-01-09
Payer: COMMERCIAL

## 2024-01-09 DIAGNOSIS — H65.93 FLUID LEVEL BEHIND TYMPANIC MEMBRANE OF BOTH EARS: ICD-10-CM

## 2024-01-09 DIAGNOSIS — H61.21 IMPACTED CERUMEN, RIGHT EAR: ICD-10-CM

## 2024-01-09 DIAGNOSIS — H69.90 EUSTACHIAN TUBE DISORDER, UNSPECIFIED LATERALITY: Primary | ICD-10-CM

## 2024-01-09 PROCEDURE — 99203 OFFICE O/P NEW LOW 30 MIN: CPT | Performed by: STUDENT IN AN ORGANIZED HEALTH CARE EDUCATION/TRAINING PROGRAM

## 2024-01-09 PROCEDURE — 69420 INCISION OF EARDRUM: CPT | Performed by: STUDENT IN AN ORGANIZED HEALTH CARE EDUCATION/TRAINING PROGRAM

## 2024-01-09 PROCEDURE — 69210 REMOVE IMPACTED EAR WAX UNI: CPT | Performed by: STUDENT IN AN ORGANIZED HEALTH CARE EDUCATION/TRAINING PROGRAM

## 2024-01-09 NOTE — PROGRESS NOTES
Roberto Carrion is a 53 year old male.   Chief Complaint   Patient presents with    Ear Problem     Ear cleaning        ASSESSMENT AND PLAN:   1. Eustachian tube disorder, unspecified laterality  53-year-old presents with feeling like both of his ears are clogged.  This has been going on for several weeks now.  He did have an upper respiratory infection at the time that this started.  He has an issue with ear fluid in the past where he needed a myringotomy in the left side in the past.    Exam with cerumen impaction on the left.  There is a serous middle ear effusion in each ear.    Decision made to perform bilateral myringotomy today given lack of improvement over the last several weeks and this is affecting his ability to function during the day.  In addition he has hypertension which would preclude the use of Sudafed or steroids to help resolve this issue medically    Much relief in his hearing after the myringotomy.  Discussed that he should continue the Flonase nasal spray that he has.  Discussed return precautions he can see me back if still not improving or if the fluid reaccumulate's    2. Fluid level behind tympanic membrane of both ears        The patient indicates understanding of these issues and agrees to the plan.      EXAM:   There were no vitals taken for this visit.    Pertinent exam findings may also be noted above in assessment and plan     System Details   Skin Inspection - Normal.   Constitutional Overall appearance - Normal.   Head/Face Symmetric, TMJ tenderness not present    Eyes EOMI, PERRL   Right ear:  Canal clear, TM intact, no TIA   Left ear:  Canal clear, TM intact, no TIA   Nose: Septum midline, inferior turbinates not enlarged, nasal valves without collapse    Oral cavity/Oropharynx: No lesions or masses on inspection or palpation, tonsils symmetric    Neck: Soft without LAD, thyroid not enlarged  Voice clear/ no stridor   Other:      Scopes and Procedures:      Procedures:  Myringotomy/Tympanostomy:  Pre-Procedure Care: Consent was obtained. Procedure/risks were explained. Questions were answered. Correct patient identified. Correct side and site confirmed.   A myringotomy was performed in the chris-inferior quadrant of the right tympanic membrane.  Anesthetic used was Phenol placed topically. Patient tolerated procedure well.    Procedures:  Myringotomy/Tympanostomy:  Pre-Procedure Care: Consent was obtained. Procedure/risks were explained. Questions were answered. Correct patient identified. Correct side and site confirmed.   A myringotomy was performed in the chris-inferior quadrant of the left tympanic membrane.  Anesthetic used was Phenol placed topically. Patient tolerated procedure well.    Canals:  Right: Canal with cerumen preventing adequate view of TM, debrided with instrumentation    Tympanic Membranes:  Right: Normal tympanic membrane.     TM Visualized Method:   Right TM examined via otomicroscopy.      PROCEDURE:   Removal of cerumen impaction   The cerumen impaction was completely removed on the right side using microscopy as necessary.   Removal was completed by using a curette and suction.       Current Outpatient Medications   Medication Sig Dispense Refill    Calcium Carbonate Antacid (TUMS OR) Take by mouth.      digoxin 0.125 MG Oral Tab Take 1 tablet (125 mcg total) by mouth daily. 30 tablet 0    apixaban 5 MG Oral Tab Take 1 tablet (5 mg total) by mouth 2 (two) times daily. 60 tablet 3    metoprolol tartrate 50 MG Oral Tab Take 1 tablet (50 mg total) by mouth 2x Daily(Beta Blocker). 60 tablet 3    pantoprazole 40 MG Oral Tab EC Take 1 tablet (40 mg total) by mouth 2 (two) times daily before meals. 60 tablet 0    clonazePAM 0.5 MG Oral Tab Take 1 tablet (0.5 mg total) by mouth 2 (two) times daily as needed for Anxiety. 60 tablet 0    FLUTICASONE PROPIONATE 50 MCG/ACT Nasal Suspension SHAKE LIQUID AND USE 2 SPRAYS IN EACH NOSTRIL DAILY 48 g 0     atorvastatin 40 MG Oral Tab Take 1 tablet (40 mg total) by mouth nightly. 90 tablet 0      Past Medical History:   Diagnosis Date    CHF (congestive heart failure) (HCC)     Congestive heart disease (HCC)     High blood pressure       Social History:  Social History     Socioeconomic History    Marital status:    Tobacco Use    Smoking status: Every Day     Packs/day: 0.50     Years: 30.00     Additional pack years: 0.00     Total pack years: 15.00     Types: Cigarettes     Passive exposure: Current    Smokeless tobacco: Never   Substance and Sexual Activity    Alcohol use: Not Currently     Comment: occasionally    Drug use: Never   Other Topics Concern    Caffeine Concern Yes     Comment: coffee, 3 cups     Social Determinants of Health     Financial Resource Strain: Low Risk  (10/24/2023)    Financial Resource Strain     Med Affordability: No   Food Insecurity: No Food Insecurity (10/22/2023)    Food Insecurity     Food Insecurity: Never true   Transportation Needs: No Transportation Needs (10/22/2023)    Transportation Needs     Lack of Transportation: No          Rickey Barnett MD  1/9/2024  10:39 AM

## 2024-04-21 DIAGNOSIS — F41.1 GENERALIZED ANXIETY DISORDER: ICD-10-CM

## 2024-04-23 RX ORDER — CLONAZEPAM 0.5 MG/1
0.5 TABLET ORAL 2 TIMES DAILY PRN
Qty: 60 TABLET | Refills: 0 | Status: SHIPPED | OUTPATIENT
Start: 2024-04-23

## 2024-04-23 NOTE — TELEPHONE ENCOUNTER
Please review. Protocol Failed; No Protocol    Recent fills: 8/27/2023  Last Rx written: 8/27/2023  LOV: 11/1/2023    Future Appointments  No visits were found meeting these conditions.  Showing future appointments within next 150 days with a meds authorizing provider and meeting all other requirements        Requested Prescriptions   Pending Prescriptions Disp Refills    CLONAZEPAM 0.5 MG Oral Tab [Pharmacy Med Name: CLONAZEPAM 0.5MG TABLETS] 60 tablet 0     Sig: TAKE 1 TABLET(0.5 MG) BY MOUTH TWICE DAILY AS NEEDED FOR ANXIETY       Controlled Substance Medication Failed - 4/21/2024  9:36 PM        Failed - This medication is a controlled substance - forward to provider to refill                 Recent Outpatient Visits              3 months ago Eustachian tube disorder, unspecified laterality    [facility], Rickey Prakash MD    Office Visit    5 months ago Right foot pain    [facility], [place], Kathryn E., APRN    Office Visit    5 months ago Essential hypertension    [facility], Abilio Dietrich MD    Office Visit    9 months ago Physical exam    [facility], Abilio Dietrich MD    Office Visit    10 months ago Acute recurrent maxillary sinusitis    [facility], Zaria Masterson MD    Office Visit

## 2024-06-09 ENCOUNTER — HOSPITAL ENCOUNTER (OUTPATIENT)
Age: 54
Discharge: HOME | End: 2024-06-09
Payer: COMMERCIAL

## 2024-06-09 VITALS
RESPIRATION RATE: 20 BRPM | DIASTOLIC BLOOD PRESSURE: 86 MMHG | HEART RATE: 70 BPM | SYSTOLIC BLOOD PRESSURE: 144 MMHG | TEMPERATURE: 98 F

## 2024-06-09 DIAGNOSIS — F17.200 CURRENT SMOKER: ICD-10-CM

## 2024-06-09 DIAGNOSIS — K05.6 PERIODONTAL DISEASE: ICD-10-CM

## 2024-06-09 DIAGNOSIS — K08.89 DENTALGIA: ICD-10-CM

## 2024-06-09 DIAGNOSIS — K04.7 DENTAL INFECTION: Primary | ICD-10-CM

## 2024-06-09 PROCEDURE — 99213 OFFICE O/P EST LOW 20 MIN: CPT

## 2024-06-09 RX ORDER — PENICILLIN V POTASSIUM 500 MG/1
500 TABLET ORAL 4 TIMES DAILY
Qty: 40 TABLET | Refills: 0 | Status: SHIPPED | OUTPATIENT
Start: 2024-06-09 | End: 2024-06-19

## 2024-06-09 NOTE — ED INITIAL ASSESSMENT (HPI)
Presents with 2 days of pain to left lower molar. Jaw now swollen. No fever. Painful to talk and chew.

## 2024-06-09 NOTE — ED PROVIDER NOTES
Patient Seen in: [facility]      History     Chief Complaint   Patient presents with    Dental Problem     Stated Complaint: tooth complaint    Subjective:   HPI    Patient is a 53-year-old male with obesity, hypertension, hyperlipidemia, congestive heart failure, paroxysmal atrial fibrillation on Eliquis, presenting to immediate care for dental infection.  Onset: Several days.  Associated left lower dental pain with associated lower jaw swelling.  He has been taking Tylenol for pain with minimal relief.  Concern for dental infection.  Similar episodes in the past.  Would like prescription for antibiotics.  Has outpatient follow-up with dentistry next week.  Denies any fevers.  No trismus or drooling.  No neck stiffness.  Not diabetic.  Not immunocompromise.    Objective:   Past Medical History:    CHF (congestive heart failure) (HCC)    Congestive heart disease (HCC)    High blood pressure              History reviewed. No pertinent surgical history.             Social History     Socioeconomic History    Marital status:    Tobacco Use    Smoking status: Every Day     Current packs/day: 0.50     Average packs/day: 0.5 packs/day for 30.0 years (15.0 ttl pk-yrs)     Types: Cigarettes     Passive exposure: Current    Smokeless tobacco: Never   Substance and Sexual Activity    Alcohol use: Not Currently     Comment: occasionally    Drug use: Never   Other Topics Concern    Caffeine Concern Yes     Comment: coffee, 3 cups     Social Determinants of Health     Financial Resource Strain: Low Risk  (10/24/2023)    Financial Resource Strain     Med Affordability: No   Food Insecurity: No Food Insecurity (10/22/2023)    Food Insecurity     Food Insecurity: Never true   Transportation Needs: No Transportation Needs (10/22/2023)    Transportation Needs     Lack of Transportation: No              Review of Systems   Constitutional:  Negative for fever.   HENT:  Positive for dental problem and facial  swelling. Negative for mouth sores, sore throat and trouble swallowing.    Gastrointestinal:  Negative for nausea and vomiting.   Musculoskeletal:  Negative for neck stiffness.   Allergic/Immunologic: Negative for immunocompromised state.   Psychiatric/Behavioral:  Negative for confusion.    All other systems reviewed and are negative.      Positive for stated complaint: tooth complaint  Other systems are as noted in HPI.  Constitutional and vital signs reviewed.      All other systems reviewed and negative except as noted above.    Physical Exam     ED Triage Vitals [06/09/24 1349]   /86   Pulse 70   Resp 20   Temp 98.2 °F (36.8 °C)   Temp src Temporal   SpO2    O2 Device        Current Vitals:   Vital Signs  BP: 144/86  Pulse: 70  Resp: 20  Temp: 98.2 °F (36.8 °C)  Temp src: Temporal            Physical Exam  Vitals and nursing note reviewed.   Constitutional:       General: He is not in acute distress.     Appearance: Normal appearance. He is not ill-appearing, toxic-appearing or diaphoretic.   HENT:      Head: Normocephalic and atraumatic.      Mouth/Throat:      Mouth: Mucous membranes are moist.      Comments: Poor dentition.  Multiple dental caries and tooth extractions.  Tenderness to palpation left lower molar.  No apical abscess.  No trismus or drooling.  Halitosis.  Trace soft tissue swelling over the left mandible without fluctuance or drainage.  Eyes:      Conjunctiva/sclera: Conjunctivae normal.   Pulmonary:      Effort: Pulmonary effort is normal. No respiratory distress.   Musculoskeletal:      Cervical back: Normal range of motion. No rigidity.   Neurological:      General: No focal deficit present.      Mental Status: He is alert and oriented to person, place, and time.      Motor: No weakness.      Gait: Gait normal.   Psychiatric:         Mood and Affect: Mood normal.         Behavior: Behavior normal.               ED Course   Labs Reviewed - No data to display             MDM     Patient  is a 53-year-old male, presenting to immediate care for evaluation of left lower dental pain with associated left-sided facial swelling for several days.  Concern for underlying dental infection.  Similar episode in the past.  His current smoker with poor dentition.  Multiple tooth extractions and dental infections.  He is well-appearing, afebrile.  Exam notable for poor dentition with halitosis and several tooth extractions with dental caries.  Tenderness to palpation left lower left molar without apical abscess.  No fluctuance or drainage.  No trismus or drooling.  No systemic symptoms.  Will treat outpatient supportively.  Pain management.  Soft diet.  Prescription for penicillin VK oral antibiotics for dental infection.  Outpatient follow-up with dentistry for definitive management/treatment.          Medical Decision Making      Disposition and Plan     Clinical Impression:  1. Dental infection    2. Periodontal disease    3. Current smoker    4. Dentalgia         Disposition:  Discharge  6/9/2024  2:00 pm    Follow-up:  No follow-up provider specified.        Medications Prescribed:  Discharge Medication List as of 6/9/2024  2:02 PM        START taking these medications    Details   penicillin v potassium 500 MG Oral Tab Take 1 tablet (500 mg total) by mouth 4 (four) times daily for 10 days., Normal, Disp-40 tablet, R-0

## 2024-06-24 ENCOUNTER — HOSPITAL ENCOUNTER (OUTPATIENT)
Age: 54
Discharge: HOME | End: 2024-06-24

## 2024-06-24 VITALS
HEART RATE: 82 BPM | RESPIRATION RATE: 16 BRPM | SYSTOLIC BLOOD PRESSURE: 157 MMHG | OXYGEN SATURATION: 97 % | DIASTOLIC BLOOD PRESSURE: 98 MMHG | TEMPERATURE: 97 F

## 2024-06-24 DIAGNOSIS — R03.0 ELEVATED BLOOD PRESSURE READING: ICD-10-CM

## 2024-06-24 DIAGNOSIS — S01.81XA CHIN LACERATION, INITIAL ENCOUNTER: Primary | ICD-10-CM

## 2024-06-24 PROCEDURE — 99213 OFFICE O/P EST LOW 20 MIN: CPT

## 2024-06-24 PROCEDURE — 12011 RPR F/E/E/N/L/M 2.5 CM/<: CPT

## 2024-06-25 NOTE — DISCHARGE INSTRUCTIONS
You had a laceration to your chin which we cleaned with sterile water.  Bleeding was controlled.  3 sutures were placed with adequate approximation.  Please follow-up with your primary care physician in 5 days or return to the immediate care in 5 days for reassessment for possible suture removal.  Do not get the sutures wet for 24 hours.    If you notice any signs of infection such as spreading redness, purulent drainage, pain, fevers, inflammation, you should present immediately to a healthcare provider for reassessment and for further recommendations.  If you notice swelling of the oral cavity, difficulty breathing, rapid progression of redness or streaking of redness present immediately to the emergency department.    You also had an elevated blood pressure for which you should follow-up with your primary care physician for reassessment and for further recommendations.  If you develop chest pain or shortness of breath or difficulty breathing, call 911 or present immediately to the emergency department.

## 2024-06-25 NOTE — ED INITIAL ASSESSMENT (HPI)
Patient with chin laceration that occurred about 30 min ago when his dog \" head - butted \" the patient   Patient holding towel to area, some blood noted on the towel. No LOC

## 2024-07-01 ENCOUNTER — HOSPITAL ENCOUNTER (OUTPATIENT)
Age: 54
Discharge: HOME | End: 2024-07-01
Attending: EMERGENCY MEDICINE
Payer: COMMERCIAL

## 2024-07-01 VITALS
OXYGEN SATURATION: 99 % | TEMPERATURE: 97 F | SYSTOLIC BLOOD PRESSURE: 137 MMHG | RESPIRATION RATE: 18 BRPM | HEART RATE: 64 BPM | DIASTOLIC BLOOD PRESSURE: 80 MMHG

## 2024-07-01 DIAGNOSIS — Z48.02 ENCOUNTER FOR REMOVAL OF SUTURES: Primary | ICD-10-CM

## 2024-07-01 NOTE — ED INITIAL ASSESSMENT (HPI)
Sutures placed to chin here 6/24. Sutures intact. No signs of infection. Here for suture removal.

## 2024-07-01 NOTE — ED PROVIDER NOTES
Patient Seen in: [facility]      History     Chief Complaint   Patient presents with    Suture Removal     Stated Complaint: suture removal    Subjective:   HPI    The patient is a 53-year-old male with past history of CHF, hypertension who presents now for suture removal.  The patient had 3 chin sutures placed 8 days ago.  Patient denies any redness, drainage.    Objective:   Past Medical History:    CHF (congestive heart failure) (HCC)    Congestive heart disease (HCC)    High blood pressure              History reviewed. No pertinent surgical history.             Social History     Socioeconomic History    Marital status:    Tobacco Use    Smoking status: Every Day     Current packs/day: 0.50     Average packs/day: 0.5 packs/day for 30.0 years (15.0 ttl pk-yrs)     Types: Cigarettes     Passive exposure: Current    Smokeless tobacco: Never   Substance and Sexual Activity    Alcohol use: Not Currently     Comment: occasionally    Drug use: Never   Other Topics Concern    Caffeine Concern Yes     Comment: coffee, 3 cups     Social Determinants of Health     Financial Resource Strain: Low Risk  (10/24/2023)    Financial Resource Strain     Med Affordability: No   Food Insecurity: No Food Insecurity (10/22/2023)    Food Insecurity     Food Insecurity: Never true   Transportation Needs: No Transportation Needs (10/22/2023)    Transportation Needs     Lack of Transportation: No              Review of Systems    Positive for stated Chief Complaint: Suture Removal    Other systems are as noted in HPI.  Constitutional and vital signs reviewed.      All other systems reviewed and negative except as noted above.    Physical Exam     ED Triage Vitals [07/01/24 1627]   /80   Pulse 64   Resp 18   Temp 97.1 °F (36.2 °C)   Temp src Temporal   SpO2 99 %   O2 Device None (Room air)       Current Vitals:   Vital Signs  BP: 137/80  Pulse: 64  Resp: 18  Temp: 97.1 °F (36.2 °C)  Temp src: Temporal    Oxygen  Therapy  SpO2: 99 %  O2 Device: None (Room air)            Physical Exam    Constitutional: Well-developed well-nourished in no acute distress  Head: Normocephalic, no swelling or tenderness  Eyes: Nonicteric sclera, no conjunctival injection  ENT: TMs are clear and flat bilaterally.  There is no posterior pharyngeal erythema  Chest: Clear to auscultation, no tenderness  Cardiovascular: Regular rate and rhythm without murmur  Abdomen: Soft, nontender and nondistended  Neurologic: Patient is awake, alert and oriented ×3.  The patient's motor strength is 5 out of 5 and symmetric in the upper and lower extremities bilaterally  Extremities: No focal swelling or tenderness  Skin: Well-healed chin laceration without evidence of infection      ED Course   Labs Reviewed - No data to display          Procedure note: Suture removal  3 sutures were removed without difficulty.  The wound remained clean, dry and intact         [facility]      Encounter for suture removal                                   Medical Decision Making      Disposition and Plan     Clinical Impression:  1. Encounter for removal of sutures         Disposition:  Discharge  7/1/2024  4:38 pm    Follow-up:  Abilio Perez MD  [address] [place] [address] [id number] [phone]      As needed          Medications Prescribed:  Current Discharge Medication List

## 2024-11-20 DIAGNOSIS — F41.1 GENERALIZED ANXIETY DISORDER: ICD-10-CM

## 2024-11-26 ENCOUNTER — LAB ENCOUNTER (OUTPATIENT)
Dept: LAB | Age: 54
End: 2024-11-26
Attending: INTERNAL MEDICINE
Payer: COMMERCIAL

## 2024-11-26 DIAGNOSIS — I10 ESSENTIAL HYPERTENSION: Primary | ICD-10-CM

## 2024-11-26 LAB
ANION GAP SERPL CALC-SCNC: 8 MMOL/L (ref 0–18)
BUN BLD-MCNC: 30 MG/DL (ref 9–23)
BUN/CREAT SERPL: 11.9 (ref 10–20)
CALCIUM BLD-MCNC: 9.4 MG/DL (ref 8.7–10.4)
CHLORIDE SERPL-SCNC: 108 MMOL/L (ref 98–112)
CO2 SERPL-SCNC: 24 MMOL/L (ref 21–32)
CREAT BLD-MCNC: 2.52 MG/DL
EGFRCR SERPLBLD CKD-EPI 2021: 29 ML/MIN/1.73M2 (ref 60–?)
FASTING STATUS PATIENT QL REPORTED: NO
GLUCOSE BLD-MCNC: 117 MG/DL (ref 70–99)
OSMOLALITY SERPL CALC.SUM OF ELEC: 297 MOSM/KG (ref 275–295)
POTASSIUM SERPL-SCNC: 5 MMOL/L (ref 3.5–5.1)
SODIUM SERPL-SCNC: 140 MMOL/L (ref 136–145)

## 2024-11-26 PROCEDURE — 36415 COLL VENOUS BLD VENIPUNCTURE: CPT

## 2024-11-26 PROCEDURE — 80048 BASIC METABOLIC PNL TOTAL CA: CPT

## 2024-11-26 RX ORDER — CLONAZEPAM 0.5 MG/1
0.5 TABLET ORAL 2 TIMES DAILY PRN
Qty: 60 TABLET | Refills: 0 | Status: SHIPPED | OUTPATIENT
Start: 2024-11-26

## 2024-11-26 RX ORDER — ERGOCALCIFEROL 1.25 MG/1
50000 CAPSULE, LIQUID FILLED ORAL WEEKLY
Qty: 12 CAPSULE | Refills: 0 | OUTPATIENT
Start: 2024-11-26

## 2024-11-26 NOTE — TELEPHONE ENCOUNTER
Please review; protocol failed    Future Appointments   Date Time Provider Department Center   1/8/2025  9:40 AM Abilio Perez MD [facility]     Last office visit: 11/7/2023 (DERRELL Taylor)  Last annual physical exam: 7/24/2023    Recent fill dates:   No dispense history in the last 6 months.  Date of  last written prescription:      Prescription written on 4/23/224 for qty of: #60 each with 0 refills       - Taken twice daily as needed    Requested Prescriptions   Pending Prescriptions Disp Refills    CLONAZEPAM 0.5 MG Oral Tab [Pharmacy Med Name: CLONAZEPAM 0.5MG TABLETS] 60 tablet 0     Sig: TAKE 1 TABLET(0.5 MG) BY MOUTH TWICE DAILY AS NEEDED       Controlled Substance Medication Failed - 11/26/2024  9:09 AM        Failed - This medication is a controlled substance - forward to provider to refill         Refused Prescriptions Disp Refills    ERGOCALCIFEROL 1.25 MG (66511 UT) Oral Cap [Pharmacy Med Name: VITAMIN D2 50,000IU (ERGO) CAP RX] 12 capsule 0     Sig: TAKE 1 CAPSULE BY MOUTH 1 TIME A WEEK       There is no refill protocol information for this order          Future Appointments         Provider Department Appt Notes    In 1 month Abilio Perez MD [facility] Blood test if Dr. Perez wants one.  General checkup and follow up for existing conditions.          Recent Outpatient Visits              10 months ago Eustachian tube disorder, unspecified laterality    [facility], Rickey Prakash MD    Office Visit    1 year ago Right foot pain    [facility] [place], [place], Kathryn E., APRN    Office Visit    1 year ago Essential hypertension    [facility], Abilio Dietrich MD    Office Visit    1 year ago Physical exam    [facility], Abilio Dietrich MD    Office Visit    1 year ago Acute recurrent maxillary  sinusitis    [facility], [place] Zaria Rodriguez MD    Office Visit

## 2024-11-26 NOTE — TELEPHONE ENCOUNTER
Dear nursing staff,    Please call patient and relay Dr. Perez's recommendation in regards to Vitamin D.    Patient take for only 3 months (8/9/023). Patient to then switch and take over the counter Vitamin D3 2,000IU (50 mcg)daily for maintenance dose.    _______________________________________________________________________    Per Dr. Perez's lab result note 8/9/2023:  Vit D is low  Prescription sent to pharmacy for   Take once a week for 3 months  Then in 3 months buy vit D3 2000 U per day over the counter  Take daily for maintenance dose     Vitamin D    Component  Ref Range & Units 8/8/23  7:27 AM   Vitamin D, 25OH, Total  30.0 - 100.0 ng/mL 23.0 Low

## 2024-12-10 ENCOUNTER — LAB ENCOUNTER (OUTPATIENT)
Dept: LAB | Age: 54
End: 2024-12-10
Attending: NURSE PRACTITIONER
Payer: COMMERCIAL

## 2024-12-10 DIAGNOSIS — I10 HYPERTENSION, ESSENTIAL: Primary | ICD-10-CM

## 2024-12-10 LAB
ANION GAP SERPL CALC-SCNC: 10 MMOL/L (ref 0–18)
BUN BLD-MCNC: 33 MG/DL (ref 9–23)
BUN/CREAT SERPL: 15.3 (ref 10–20)
CALCIUM BLD-MCNC: 9.6 MG/DL (ref 8.7–10.4)
CHLORIDE SERPL-SCNC: 108 MMOL/L (ref 98–112)
CO2 SERPL-SCNC: 21 MMOL/L (ref 21–32)
CREAT BLD-MCNC: 2.15 MG/DL
EGFRCR SERPLBLD CKD-EPI 2021: 35 ML/MIN/1.73M2 (ref 60–?)
FASTING STATUS PATIENT QL REPORTED: NO
GLUCOSE BLD-MCNC: 96 MG/DL (ref 70–99)
OSMOLALITY SERPL CALC.SUM OF ELEC: 295 MOSM/KG (ref 275–295)
POTASSIUM SERPL-SCNC: 4.7 MMOL/L (ref 3.5–5.1)
SODIUM SERPL-SCNC: 139 MMOL/L (ref 136–145)

## 2024-12-10 PROCEDURE — 80048 BASIC METABOLIC PNL TOTAL CA: CPT

## 2024-12-10 PROCEDURE — 36415 COLL VENOUS BLD VENIPUNCTURE: CPT

## 2025-01-08 ENCOUNTER — OFFICE VISIT (OUTPATIENT)
Dept: INTERNAL MEDICINE CLINIC | Facility: CLINIC | Age: 55
End: 2025-01-08
Payer: COMMERCIAL

## 2025-01-08 VITALS
BODY MASS INDEX: 35.68 KG/M2 | WEIGHT: 209 LBS | HEIGHT: 64 IN | SYSTOLIC BLOOD PRESSURE: 114 MMHG | DIASTOLIC BLOOD PRESSURE: 75 MMHG | HEART RATE: 64 BPM

## 2025-01-08 DIAGNOSIS — Z12.5 SCREENING FOR PROSTATE CANCER: ICD-10-CM

## 2025-01-08 DIAGNOSIS — Z12.11 SCREENING FOR COLON CANCER: ICD-10-CM

## 2025-01-08 DIAGNOSIS — F32.4 MAJOR DEPRESSIVE DISORDER WITH SINGLE EPISODE, IN PARTIAL REMISSION (HCC): ICD-10-CM

## 2025-01-08 DIAGNOSIS — R39.9 URINARY SYMPTOM OR SIGN: ICD-10-CM

## 2025-01-08 DIAGNOSIS — E78.49 OTHER HYPERLIPIDEMIA: ICD-10-CM

## 2025-01-08 DIAGNOSIS — F41.1 GENERALIZED ANXIETY DISORDER: ICD-10-CM

## 2025-01-08 DIAGNOSIS — I48.0 PAROXYSMAL ATRIAL FIBRILLATION (HCC): ICD-10-CM

## 2025-01-08 DIAGNOSIS — N52.9 ERECTILE DYSFUNCTION, UNSPECIFIED ERECTILE DYSFUNCTION TYPE: ICD-10-CM

## 2025-01-08 DIAGNOSIS — Z00.00 PHYSICAL EXAM: Primary | ICD-10-CM

## 2025-01-08 DIAGNOSIS — I10 ESSENTIAL HYPERTENSION: ICD-10-CM

## 2025-01-08 PROBLEM — R07.9 CHEST PAIN OF UNCERTAIN ETIOLOGY: Status: RESOLVED | Noted: 2023-10-22 | Resolved: 2025-01-08

## 2025-01-08 PROCEDURE — 99213 OFFICE O/P EST LOW 20 MIN: CPT | Performed by: INTERNAL MEDICINE

## 2025-01-08 PROCEDURE — 99396 PREV VISIT EST AGE 40-64: CPT | Performed by: INTERNAL MEDICINE

## 2025-01-08 RX ORDER — AMLODIPINE BESYLATE 2.5 MG/1
2.5 TABLET ORAL DAILY
COMMUNITY
Start: 2024-12-27

## 2025-01-08 NOTE — PROGRESS NOTES
HPI:    Patient ID: Roberto Carrion is a 54 year old male.    HPI    Physical exam    Hx of major depression  anxiety   ongoing counseling with Elise Leong  Still working through issues  family   stopped alcohol use    In the past  binge alcohol drink  Hx of HTN    Weight is up    No sigh of CHF  Chronic urinary difficulty most of his life   with erectile dysfuntion    12/27/2024 cardiology visit  Mr. Carrion presents for follow-up on blood pressure.He follows with Dr. Giordano for history of atrial fibrillation, hypertension, hyperlipidemia.He was recently started on losartan 50 mg once a day for blood pressure and his metoprolol was stopped. He was noted to have an increase in blood pressure and metoprolol was resumed. In addition he had an increase in his creatinine to 2.52 and losartan was stopped and he was started on low-dose amlodipine.   Repeat labs with normal electrolytes and an improvement in his creatinine to 2.15 after holding losartan. (Creatinine from 2023 was 2.08)  He denies chest pain, shortness of breath, palpitations, dizziness, syncope or pre syncope. BP at home on wrist cuff consistently 150/100. He unfortunately did not bring his cuff with him into the office today.   Hypertension, currently on metoprolol tartrate 50 mg twice daily and amlodipine 2.5 mg daily. Blood pressure appears controlled with manual blood pressure, however patient reports elevated blood preesure on home wrist cuff. He plans to obtain a new cuff, recommended Omron upper arm cuff. He will continue to monitor his blood pressure and bring his cuff to next visit. Will continue current medications. Discussed lifestyle changes including diet and exercise to improve blood pressureAtrial fibrillation, no reoccurrence for greater than 1 year. Plans to follow-up with Dr. Giordano as needed if recurrence. CKD, Creatinine 2.15. Stable compared to 2023. Advised to follow up with PCP for continued surveillance. Plan:  -Monitor BP at home  daily, 2 hours after medication. Recommend Omron blood pressure cuff, bring to next visit. Please provide home BP log   -Recommend low salt/DASH diet   -Follow up in 4-6 weeks with DI to reevaluate blood pressureIncreased BMI: Provide patient with information regarding diet and lifestyle changes.   /75 (BP Location: Right arm, Patient Position: Sitting, Cuff Size: adult)   Pulse 64   Ht 5' 4\" (1.626 m)   Wt 209 lb (94.8 kg)   BMI 35.87 kg/m²   Wt Readings from Last 6 Encounters:   01/08/25 209 lb (94.8 kg)   11/07/23 202 lb (91.6 kg)   11/01/23 200 lb (90.7 kg)   10/23/23 205 lb 4.8 oz (93.1 kg)   07/24/23 202 lb (91.6 kg)   06/01/23 194 lb (88 kg)     Body mass index is 35.87 kg/m².  HGBA1C:    Lab Results   Component Value Date    A1C 5.6 08/08/2023    A1C 5.7 (H) 03/09/2022    A1C 5.7 (H) 11/23/2019     08/08/2023         Review of Systems   Constitutional: Negative.  Negative for appetite change, chills, fatigue and fever.   HENT:  Negative for congestion, ear discharge, ear pain, rhinorrhea, sinus pressure, sneezing, sore throat, trouble swallowing and voice change.    Eyes:  Negative for pain and discharge.   Respiratory:  Negative for cough, chest tightness, shortness of breath and wheezing.    Cardiovascular:  Negative for chest pain, palpitations and leg swelling.   Gastrointestinal:  Negative for abdominal distention, abdominal pain, blood in stool, constipation, diarrhea, nausea and vomiting.   Endocrine: Negative for cold intolerance and heat intolerance.   Genitourinary:  Negative for decreased urine volume, difficulty urinating, dysuria, frequency, hematuria and urgency.   Musculoskeletal:  Negative for arthralgias, back pain, gait problem and joint swelling.   Skin:  Negative for rash.   Allergic/Immunologic: Negative for environmental allergies and food allergies.   Neurological:  Negative for dizziness, tremors, seizures, weakness, light-headedness and headaches.   Hematological:   Negative for adenopathy. Does not bruise/bleed easily.   Psychiatric/Behavioral:  Positive for dysphoric mood. Negative for behavioral problems, confusion and sleep disturbance. The patient is nervous/anxious.          Current Outpatient Medications   Medication Sig Dispense Refill    amLODIPine 2.5 MG Oral Tab Take 1 tablet (2.5 mg total) by mouth daily.      Cholecalciferol 50 MCG (2000 UT) Oral Tab Vitamin D3 50 mcg (2,000 unit) tablet, [RxNorm: 415612]      clonazePAM 0.5 MG Oral Tab Take 1 tablet (0.5 mg total) by mouth 2 (two) times daily as needed. 60 tablet 0    Calcium Carbonate Antacid (TUMS OR) Take by mouth.      metoprolol tartrate 50 MG Oral Tab Take 1 tablet (50 mg total) by mouth 2x Daily(Beta Blocker). 60 tablet 3     Allergies:Allergies[1]    HISTORY:  Past Medical History:    CHF (congestive heart failure) (HCC)    Congestive heart disease (HCC)    High blood pressure      History reviewed. No pertinent surgical history.   Family History   Problem Relation Age of Onset    Hypertension Mother       Social History:   Social History     Socioeconomic History    Marital status:    Tobacco Use    Smoking status: Every Day     Current packs/day: 0.50     Average packs/day: 0.5 packs/day for 30.0 years (15.0 ttl pk-yrs)     Types: Cigarettes     Passive exposure: Current    Smokeless tobacco: Never   Vaping Use    Vaping status: Never Used   Substance and Sexual Activity    Alcohol use: Not Currently     Comment: occasionally    Drug use: Never   Other Topics Concern    Caffeine Concern Yes     Comment: coffee, 3 cups     Social Drivers of Health     Financial Resource Strain: Low Risk  (10/24/2023)    Financial Resource Strain     Med Affordability: No   Food Insecurity: No Food Insecurity (10/22/2023)    Food Insecurity     Food Insecurity: Never true   Transportation Needs: No Transportation Needs (10/22/2023)    Transportation Needs     Lack of Transportation: No        PHYSICAL EXAM:     Physical Exam  Constitutional:       Appearance: He is not ill-appearing.   HENT:      Right Ear: Ear canal normal.      Left Ear: Ear canal normal.      Mouth/Throat:      Pharynx: Oropharynx is clear.   Eyes:      Extraocular Movements: Extraocular movements intact.      Conjunctiva/sclera: Conjunctivae normal.      Pupils: Pupils are equal, round, and reactive to light.   Neck:      Vascular: No carotid bruit.   Cardiovascular:      Rate and Rhythm: Normal rate and regular rhythm.   Pulmonary:      Effort: No respiratory distress.      Breath sounds: Normal breath sounds. No wheezing.   Abdominal:      General: Bowel sounds are normal.      Palpations: Abdomen is soft.   Musculoskeletal:      Right lower leg: No edema.      Left lower leg: No edema.   Lymphadenopathy:      Cervical: No cervical adenopathy.   Skin:     General: Skin is warm and dry.   Neurological:      Mental Status: He is alert.   Psychiatric:         Mood and Affect: Mood normal.         Behavior: Behavior normal.         Thought Content: Thought content normal.              ASSESSMENT/PLAN:   (Z00.00) Physical exam  (primary encounter diagnosis)  Plan: CBC With Differential With Platelet, Comp         Metabolic Panel (14), Lipid Panel, Hemoglobin         A1C, TSH and Free T4, Urinalysis, Routine        Medically stable    Health screening   Colonoscopy, prostate ca screen  And routine eye exam advised.      (I10) Essential hypertension  Plan: /75 (BP Location: Right arm, Patient Position: Sitting, Cuff Size: adult)   Pulse 64   Ht 5' 4\" (1.626 m)   Wt 209 lb (94.8 kg)   BMI 35.87 kg/m²   Controlled monitor    (I48.0) Paroxysmal atrial fibrillation (HCC)  Plan: controlled monitor    (Z12.5) Screening for prostate cancer  Plan: PSA Total, Screen        Symptomatic  lab ordered  Follow up with urology adivs ed    (Z12.11) Screening for colon cancer  Plan: GASTRO - INTERNAL        Asymptomatic  monitor      (F32.4) Major depressive  disorder with single episode, in partial remission (HCC)  Plan: [facility] Referral - In Network        Chronic   follow up with   [facility]    (N52.9) Erectile dysfunction, unspecified erectile dysfunction type  Plan: UROLOGY - INTERNAL        Chronic recurrent    (R39.9) Urinary symptom or sign  Plan: UROLOGY - INTERNAL, Urine Culture, Routine [E]        Chronic recurrent    (F41.1) Generalized anxiety disorder  Plan: chronic   prn med    (E78.49) Other hyperlipidemia  Plan: Low cholesterol diet advised  Avoid saturated and trans fats     Medications and most recent test results reviewed  Refill medicaitons  as needed  Potential side effect discussed  Modification of risk for CAD advised    Dietary an lifestyle change  Pt voiced understanding and agrees with plan  Pt given time to ask questions  After Visit Summary handout    Discussed  And given to patient.        Orders Placed This Encounter   Procedures    CBC With Differential With Platelet    Comp Metabolic Panel (14)    Lipid Panel    Hemoglobin A1C    TSH and Free T4    Urinalysis, Routine    PSA Total, Screen    Fluzone trivalent vaccine, PF 0.5mL, 6mo+ (10947)    Prevnar 20 (PCV20) [29440]    Urine Culture, Routine [E]       Meds This Visit:  Requested Prescriptions      No prescriptions requested or ordered in this encounter       Imaging & Referrals:  GASTRO - INTERNAL  UROLOGY - INTERNAL  INFLUENZA VACCINE, TRI, PRESERV FREE, 0.5 ML  PCV20 VACCINE FOR INTRAMUSCULAR USE  OP REFERRAL TO [facility]        ID#[id number]           [1] No Known Allergies

## 2025-01-10 ENCOUNTER — TELEPHONE (OUTPATIENT)
Dept: INTERNAL MEDICINE CLINIC | Facility: CLINIC | Age: 55
End: 2025-01-10

## 2025-01-10 NOTE — TELEPHONE ENCOUNTER
Name and  verified. Patient was returning a call - no record of a call out from our office in the chart. Informed him the GI or urology office may have called him.

## 2025-01-29 ENCOUNTER — LAB ENCOUNTER (OUTPATIENT)
Dept: LAB | Age: 55
End: 2025-01-29
Attending: INTERNAL MEDICINE
Payer: COMMERCIAL

## 2025-01-29 DIAGNOSIS — Z12.5 SCREENING FOR PROSTATE CANCER: ICD-10-CM

## 2025-01-29 DIAGNOSIS — R39.9 URINARY SYMPTOM OR SIGN: ICD-10-CM

## 2025-01-29 DIAGNOSIS — Z00.00 PHYSICAL EXAM: ICD-10-CM

## 2025-01-29 LAB
ALBUMIN SERPL-MCNC: 4.2 G/DL (ref 3.2–4.8)
ALBUMIN/GLOB SERPL: 1.5 {RATIO} (ref 1–2)
ALP LIVER SERPL-CCNC: 86 U/L
ALT SERPL-CCNC: 19 U/L
ANION GAP SERPL CALC-SCNC: 8 MMOL/L (ref 0–18)
AST SERPL-CCNC: 16 U/L (ref ?–34)
BASOPHILS # BLD AUTO: 0.08 X10(3) UL (ref 0–0.2)
BASOPHILS NFR BLD AUTO: 0.6 %
BILIRUB SERPL-MCNC: 0.3 MG/DL (ref 0.3–1.2)
BILIRUB UR QL: NEGATIVE
BUN BLD-MCNC: 41 MG/DL (ref 9–23)
BUN/CREAT SERPL: 17.8 (ref 10–20)
CALCIUM BLD-MCNC: 9.5 MG/DL (ref 8.7–10.4)
CHLORIDE SERPL-SCNC: 106 MMOL/L (ref 98–112)
CHOLEST SERPL-MCNC: 287 MG/DL (ref ?–200)
CLARITY UR: CLEAR
CO2 SERPL-SCNC: 26 MMOL/L (ref 21–32)
COMPLEXED PSA SERPL-MCNC: 1.01 NG/ML (ref ?–4)
CREAT BLD-MCNC: 2.3 MG/DL
DEPRECATED RDW RBC AUTO: 44.8 FL (ref 35.1–46.3)
EGFRCR SERPLBLD CKD-EPI 2021: 33 ML/MIN/1.73M2 (ref 60–?)
EOSINOPHIL # BLD AUTO: 0.42 X10(3) UL (ref 0–0.7)
EOSINOPHIL NFR BLD AUTO: 3.3 %
ERYTHROCYTE [DISTWIDTH] IN BLOOD BY AUTOMATED COUNT: 12.4 % (ref 11–15)
EST. AVERAGE GLUCOSE BLD GHB EST-MCNC: 140 MG/DL (ref 68–126)
FASTING PATIENT LIPID ANSWER: NO
FASTING STATUS PATIENT QL REPORTED: NO
GLOBULIN PLAS-MCNC: 2.8 G/DL (ref 2–3.5)
GLUCOSE BLD-MCNC: 85 MG/DL (ref 70–99)
GLUCOSE UR-MCNC: 50 MG/DL
HBA1C MFR BLD: 6.5 % (ref ?–5.7)
HCT VFR BLD AUTO: 46.3 %
HDLC SERPL-MCNC: 34 MG/DL (ref 40–59)
HGB BLD-MCNC: 15.8 G/DL
IMM GRANULOCYTES # BLD AUTO: 0.09 X10(3) UL (ref 0–1)
IMM GRANULOCYTES NFR BLD: 0.7 %
KETONES UR-MCNC: NEGATIVE MG/DL
LDLC SERPL CALC-MCNC: 133 MG/DL (ref ?–100)
LEUKOCYTE ESTERASE UR QL STRIP.AUTO: NEGATIVE
LYMPHOCYTES # BLD AUTO: 2.88 X10(3) UL (ref 1–4)
LYMPHOCYTES NFR BLD AUTO: 22.6 %
MCH RBC QN AUTO: 33.5 PG (ref 26–34)
MCHC RBC AUTO-ENTMCNC: 34.1 G/DL (ref 31–37)
MCV RBC AUTO: 98.3 FL
MONOCYTES # BLD AUTO: 0.92 X10(3) UL (ref 0.1–1)
MONOCYTES NFR BLD AUTO: 7.2 %
NEUTROPHILS # BLD AUTO: 8.36 X10 (3) UL (ref 1.5–7.7)
NEUTROPHILS # BLD AUTO: 8.36 X10(3) UL (ref 1.5–7.7)
NEUTROPHILS NFR BLD AUTO: 65.6 %
NITRITE UR QL STRIP.AUTO: NEGATIVE
NONHDLC SERPL-MCNC: 253 MG/DL (ref ?–130)
OSMOLALITY SERPL CALC.SUM OF ELEC: 299 MOSM/KG (ref 275–295)
PH UR: 6 [PH] (ref 5–8)
PLATELET # BLD AUTO: 173 10(3)UL (ref 150–450)
POTASSIUM SERPL-SCNC: 4.6 MMOL/L (ref 3.5–5.1)
PROT SERPL-MCNC: 7 G/DL (ref 5.7–8.2)
PROT UR-MCNC: 200 MG/DL
RBC # BLD AUTO: 4.71 X10(6)UL
SODIUM SERPL-SCNC: 140 MMOL/L (ref 136–145)
SP GR UR STRIP: 1.02 (ref 1–1.03)
T4 FREE SERPL-MCNC: 0.9 NG/DL (ref 0.8–1.7)
TRIGL SERPL-MCNC: 644 MG/DL (ref 30–149)
TSI SER-ACNC: 1.12 UIU/ML (ref 0.55–4.78)
UROBILINOGEN UR STRIP-ACNC: NORMAL
VLDLC SERPL CALC-MCNC: 126 MG/DL (ref 0–30)
WBC # BLD AUTO: 12.8 X10(3) UL (ref 4–11)

## 2025-01-29 PROCEDURE — 80061 LIPID PANEL: CPT

## 2025-01-29 PROCEDURE — 80053 COMPREHEN METABOLIC PANEL: CPT

## 2025-01-29 PROCEDURE — 84439 ASSAY OF FREE THYROXINE: CPT

## 2025-01-29 PROCEDURE — 84443 ASSAY THYROID STIM HORMONE: CPT

## 2025-01-29 PROCEDURE — 36415 COLL VENOUS BLD VENIPUNCTURE: CPT

## 2025-01-29 PROCEDURE — 83036 HEMOGLOBIN GLYCOSYLATED A1C: CPT

## 2025-01-29 PROCEDURE — 87086 URINE CULTURE/COLONY COUNT: CPT

## 2025-01-29 PROCEDURE — 81001 URINALYSIS AUTO W/SCOPE: CPT

## 2025-01-29 PROCEDURE — 85025 COMPLETE CBC W/AUTO DIFF WBC: CPT

## 2025-02-20 ENCOUNTER — OFFICE VISIT (OUTPATIENT)
Dept: SURGERY | Facility: CLINIC | Age: 55
End: 2025-02-20

## 2025-02-20 VITALS
HEIGHT: 64 IN | WEIGHT: 200 LBS | SYSTOLIC BLOOD PRESSURE: 114 MMHG | BODY MASS INDEX: 34.15 KG/M2 | HEART RATE: 72 BPM | DIASTOLIC BLOOD PRESSURE: 78 MMHG

## 2025-02-20 DIAGNOSIS — R39.12 BENIGN PROSTATIC HYPERPLASIA WITH WEAK URINARY STREAM: Primary | ICD-10-CM

## 2025-02-20 DIAGNOSIS — N40.1 BENIGN PROSTATIC HYPERPLASIA WITH WEAK URINARY STREAM: Primary | ICD-10-CM

## 2025-02-20 PROCEDURE — 3074F SYST BP LT 130 MM HG: CPT | Performed by: UROLOGY

## 2025-02-20 PROCEDURE — 3008F BODY MASS INDEX DOCD: CPT | Performed by: UROLOGY

## 2025-02-20 PROCEDURE — 3078F DIAST BP <80 MM HG: CPT | Performed by: UROLOGY

## 2025-02-20 PROCEDURE — 99204 OFFICE O/P NEW MOD 45 MIN: CPT | Performed by: UROLOGY

## 2025-02-20 RX ORDER — TAMSULOSIN HYDROCHLORIDE 0.4 MG/1
0.4 CAPSULE ORAL DAILY
Qty: 90 CAPSULE | Refills: 3 | Status: SHIPPED | OUTPATIENT
Start: 2025-02-20 | End: 2026-02-15

## 2025-02-20 RX ORDER — SILDENAFIL 25 MG/1
25 TABLET, FILM COATED ORAL
Qty: 8 TABLET | Refills: 5 | Status: SHIPPED | OUTPATIENT
Start: 2025-02-20

## 2025-02-20 NOTE — PROGRESS NOTES
SUBJECTIVE:  Roberto Carrion is a 54 year old male who presents for a consultation at the request of, and a copy of this note will be sent to, Abilio Valdez, for evaluation of  benign prostatic hyperplasia and erectile dysfunction. He states that the problem is unchanged. Symptoms include episodes of lower urinary tract symptoms with frequency urgency.  Typically respond to courses of oral antibiotics usually Cipro.  At this point he states his symptoms are persistent and include weak stream, urgency, occasional urge associated incontinence.  IPSS score is 24 quality-of-life index of 5.  PSA January 29, 2025 normal at 1.01.  Urinalysis January 29, 2025 normal.  Denies constipation excessive alcohol or caffeine intake.  No previous urologic history otherwise.  He provides a history that it is late teens he had chlamydia for a few months before he sought treatment because of embarrassment.  Eventually treated appropriately with antibiotics.    Reports occasional decreased quality of erections.  Usually masturbates but states he is in a new relationship and would like to be active sexually.  Has tried sildenafil 25 mg tablets in the past.  Usually response to half a tablet.  Requests a refill.  Reports normal libido.  He denies any other complaints.   Allergies: Allergies[1]    History:  Past Medical History:    CHF (congestive heart failure) (HCC)    Congestive heart disease (HCC)    High blood pressure      No past surgical history on file.   Family History   Problem Relation Age of Onset    Hypertension Mother       Social History:   Social History     Socioeconomic History    Marital status:    Tobacco Use    Smoking status: Every Day     Current packs/day: 0.50     Average packs/day: 0.5 packs/day for 30.0 years (15.0 ttl pk-yrs)     Types: Cigarettes     Passive exposure: Current    Smokeless tobacco: Never   Vaping Use    Vaping status: Never Used   Substance and Sexual Activity    Alcohol use:  Not Currently     Comment: occasionally    Drug use: Never   Other Topics Concern    Caffeine Concern Yes     Comment: coffee, 3 cups     Social Drivers of Health     Food Insecurity: No Food Insecurity (10/22/2023)    Food Insecurity     Food Insecurity: Never true   Transportation Needs: No Transportation Needs (10/22/2023)    Transportation Needs     Lack of Transportation: No            REVIEW OF SYSTEMS:  RESPIRATORY:  Negative for chest tightness, wheezing, cough, shortness of breath,  sputum production,chest pain or pleurisy.  CARDIOVASCULAR:  Negative for pain or chest discomfort, dizziness or fainting, palpitations, leg swelling, nocturia, or claudication.  GASTROINTESTINAL:  Negative for nausea, vomiting, diarrhea, constipation, heartburn or indigestion, abdominal pains, bloody or tarry stools.  GENERAL: Denies:  weight gain, weight loss, fever, night sweats, bone pain, malaise, and fatigue. Positive for:  none all other review of systems reviewed and otherwise negative.      OBJECTIVE:  /78 (BP Location: Left arm, Patient Position: Sitting, Cuff Size: adult)   Pulse 72   Ht 5' 4\" (1.626 m)   Wt 200 lb (90.7 kg)   BMI 34.33 kg/m²   He appears well, in no apparent distress.  Alert and oriented times three, pleasant and cooperative.  CARDIOVASCULAR:normal apical impulse  RESPIRATORY: no chest wall deformities or tenderness  ABDOMEN: abdomen is soft without significant tenderness, masses, organomegaly or guarding  GENITOURINARY:      Penis: no penile lesions or discharge. Meatus normal location and size.      Scrotum: normal in appearance      Right Epididymis and Vas: both are palpably normal.      Right Testis: normal        Left Epididymis and Vas: both are palpably normal.      Left Testis: normal        Inguinal Lymph Nodes: non-palpable both      Prostate: prostate smooth and symmetric without tenderness or nodules, enlarged, 50 grams       Rectal: normal tone, no masses  Skin exam grossly  normal  Intact neurologically grossly    Bladder scan for postvoid residual volume 95 mL    ASSESSMENT/PLAN  Encounter Diagnosis   Name Primary?    Benign prostatic hyperplasia with weak urinary stream Yes   Reviewed findings at length with patient.  Recommended:  - BPH/lower urinary tract symptoms: We will start him empirically on tamsulosin 0.4 mg daily.  Cystoscopy to rule out urethral stricture disease given above history.  - Erectile dysfunction: Prescription refill for sildenafil 25 mg as needed.    No orders of the defined types were placed in this encounter.      Meds This Visit:  Requested Prescriptions      No prescriptions requested or ordered in this encounter       Imaging & Referrals:  None                        [1] No Known Allergies

## 2025-03-04 ENCOUNTER — NURSE TRIAGE (OUTPATIENT)
Dept: INTERNAL MEDICINE CLINIC | Facility: CLINIC | Age: 55
End: 2025-03-04

## 2025-03-04 ENCOUNTER — OFFICE VISIT (OUTPATIENT)
Dept: INTERNAL MEDICINE CLINIC | Facility: CLINIC | Age: 55
End: 2025-03-04

## 2025-03-04 VITALS
HEIGHT: 64 IN | OXYGEN SATURATION: 94 % | HEART RATE: 77 BPM | RESPIRATION RATE: 16 BRPM | BODY MASS INDEX: 35.34 KG/M2 | WEIGHT: 207 LBS | TEMPERATURE: 97 F | SYSTOLIC BLOOD PRESSURE: 124 MMHG | DIASTOLIC BLOOD PRESSURE: 84 MMHG

## 2025-03-04 DIAGNOSIS — H91.93 DECREASED HEARING OF BOTH EARS: ICD-10-CM

## 2025-03-04 DIAGNOSIS — H93.8X3 CLOGGED EAR, BILATERAL: Primary | ICD-10-CM

## 2025-03-04 PROCEDURE — 3079F DIAST BP 80-89 MM HG: CPT | Performed by: NURSE PRACTITIONER

## 2025-03-04 PROCEDURE — 3074F SYST BP LT 130 MM HG: CPT | Performed by: NURSE PRACTITIONER

## 2025-03-04 PROCEDURE — 99213 OFFICE O/P EST LOW 20 MIN: CPT | Performed by: NURSE PRACTITIONER

## 2025-03-04 PROCEDURE — 3008F BODY MASS INDEX DOCD: CPT | Performed by: NURSE PRACTITIONER

## 2025-03-04 RX ORDER — FLUTICASONE PROPIONATE 50 MCG
2 SPRAY, SUSPENSION (ML) NASAL DAILY
Qty: 11.1 ML | Refills: 0 | Status: SHIPPED | OUTPATIENT
Start: 2025-03-04 | End: 2026-02-27

## 2025-03-04 NOTE — TELEPHONE ENCOUNTER
Patient called to report:   Both Ears clogged. Feels liquid in ears. Muffled. Right ear feels worse. ringling  No pain. No dizziness.     Reason for Disposition   Patient wants to be seen    Protocols used: Hearing Loss or Change-A-OH

## 2025-03-04 NOTE — PROGRESS NOTES
Roberto Carrion is a 54 year old male.  Chief Complaint   Patient presents with    Ear Problem     Both ears feel clogged     HPI:   He presents with both ears feeling clogged. He has had symptoms for about 2 weeks. He has tried a nasal spray with little relief.     No ear pain.   Hearing is muffled.     He had wax removal about one year ago.     He has previously seen Dr Barnett on 1/9/2024. At that time he had eustachian tube disorder and fluid behind tympanic membrane of both ears. He also had right ear wax removal.       Current Outpatient Medications   Medication Sig Dispense Refill    fluticasone propionate 50 MCG/ACT Nasal Suspension 2 sprays by Each Nare route daily. 11.1 mL 0    tamsulosin 0.4 MG Oral Cap Take 1 capsule (0.4 mg total) by mouth daily. Take 1/2 hour following the same meal each day 90 capsule 3    Sildenafil Citrate 25 MG Oral Tab Take 1 tablet (25 mg total) by mouth daily as needed for Erectile Dysfunction. 8 tablet 5    amLODIPine 2.5 MG Oral Tab Take 1 tablet (2.5 mg total) by mouth daily.      Cholecalciferol 50 MCG (2000 UT) Oral Tab Vitamin D3 50 mcg (2,000 unit) tablet, [RxNorm: 774330]      clonazePAM 0.5 MG Oral Tab Take 1 tablet (0.5 mg total) by mouth 2 (two) times daily as needed. 60 tablet 0    Calcium Carbonate Antacid (TUMS OR) Take by mouth.      metoprolol tartrate 50 MG Oral Tab Take 1 tablet (50 mg total) by mouth 2x Daily(Beta Blocker). 60 tablet 3      Past Medical History:    CHF (congestive heart failure) (HCC)    Congestive heart disease (HCC)    High blood pressure      No past surgical history on file.   Social History:  Social History     Socioeconomic History    Marital status:    Tobacco Use    Smoking status: Every Day     Current packs/day: 0.50     Average packs/day: 0.5 packs/day for 30.0 years (15.0 ttl pk-yrs)     Types: Cigarettes     Passive exposure: Current    Smokeless tobacco: Never   Vaping Use    Vaping status: Never Used   Substance and  Sexual Activity    Alcohol use: Not Currently     Comment: occasionally    Drug use: Never   Other Topics Concern    Caffeine Concern Yes     Comment: coffee, 3 cups     Social Drivers of Health     Food Insecurity: No Food Insecurity (10/22/2023)    Food Insecurity     Food Insecurity: Never true   Transportation Needs: No Transportation Needs (10/22/2023)    Transportation Needs     Lack of Transportation: No      Family History   Problem Relation Age of Onset    Hypertension Mother       Allergies[1]     REVIEW OF SYSTEMS:     Review of Systems   Constitutional:  Negative for fever.   HENT:  Positive for hearing loss. Negative for congestion, ear pain and rhinorrhea.         Bilateral ears feel clogged   Respiratory:  Negative for cough, shortness of breath and wheezing.    Cardiovascular:  Negative for chest pain.   Gastrointestinal: Negative.    Genitourinary: Negative.    Musculoskeletal: Negative.    Skin: Negative.    Neurological:  Negative for dizziness and headaches.   Psychiatric/Behavioral: Negative.        Wt Readings from Last 5 Encounters:   03/04/25 207 lb (93.9 kg)   02/20/25 200 lb (90.7 kg)   01/08/25 209 lb (94.8 kg)   11/07/23 202 lb (91.6 kg)   11/01/23 200 lb (90.7 kg)     Body mass index is 35.53 kg/m².      EXAM:   /84 (BP Location: Right arm, Patient Position: Sitting, Cuff Size: large)   Pulse 77   Temp 97.3 °F (36.3 °C) (Temporal)   Resp 16   Ht 5' 4\" (1.626 m)   Wt 207 lb (93.9 kg)   SpO2 94%   BMI 35.53 kg/m²     Physical Exam  Vitals reviewed.   Constitutional:       Appearance: Normal appearance.   HENT:      Head: Normocephalic.      Right Ear: Decreased hearing noted. A middle ear effusion is present.      Left Ear: Decreased hearing noted.      Ears:      Comments: Cerumen present in left ear canal. No impaction but unable to see TM.   Eyes:      Extraocular Movements: Extraocular movements intact.      Pupils: Pupils are equal, round, and reactive to light.    Cardiovascular:      Rate and Rhythm: Normal rate and regular rhythm.      Pulses: Normal pulses.   Pulmonary:      Breath sounds: Normal breath sounds. No wheezing.   Abdominal:      General: Bowel sounds are normal.      Palpations: Abdomen is soft.   Musculoskeletal:         General: No swelling. Normal range of motion.   Skin:     General: Skin is warm and dry.   Neurological:      Mental Status: He is alert and oriented to person, place, and time.   Psychiatric:         Mood and Affect: Mood normal.         Behavior: Behavior normal.            ASSESSMENT AND PLAN:   1. Clogged ear, bilateral  - ENT Referral - [facility])  - fluticasone propionate 50 MCG/ACT Nasal Suspension; 2 sprays by Each Nare route daily.  Dispense: 11.1 mL; Refill: 0    2. Decreased hearing of both ears  - ENT Referral - [facility])      The patient indicates understanding of these issues and agrees to the plan.  Return for make an appontment with Dr Barnett.         [1] No Known Allergies

## 2025-03-11 ENCOUNTER — OFFICE VISIT (OUTPATIENT)
Dept: OTOLARYNGOLOGY | Facility: CLINIC | Age: 55
End: 2025-03-11
Payer: COMMERCIAL

## 2025-03-11 DIAGNOSIS — H61.22 IMPACTED CERUMEN, LEFT EAR: ICD-10-CM

## 2025-03-11 DIAGNOSIS — H69.90 EUSTACHIAN TUBE DISORDER, UNSPECIFIED LATERALITY: Primary | ICD-10-CM

## 2025-03-11 DIAGNOSIS — H65.93 FLUID LEVEL BEHIND TYMPANIC MEMBRANE OF BOTH EARS: ICD-10-CM

## 2025-03-11 PROCEDURE — 69433 CREATE EARDRUM OPENING: CPT | Performed by: STUDENT IN AN ORGANIZED HEALTH CARE EDUCATION/TRAINING PROGRAM

## 2025-03-11 PROCEDURE — 99214 OFFICE O/P EST MOD 30 MIN: CPT | Performed by: STUDENT IN AN ORGANIZED HEALTH CARE EDUCATION/TRAINING PROGRAM

## 2025-03-11 NOTE — PROGRESS NOTES
Roberto Carrion is a 54 year old male.   Chief Complaint   Patient presents with    Ear Problem     Fluid build-up behind ears and muffled hearing  Pt reports head feels congested     HPI:   54-year-old presents with bilateral muffled hearing.  Associated with an upper respiratory infection.  Has not responded to Flonase.  Required myringotomy in the past    Current Outpatient Medications   Medication Sig Dispense Refill    fluticasone propionate 50 MCG/ACT Nasal Suspension 2 sprays by Each Nare route daily. 11.1 mL 0    tamsulosin 0.4 MG Oral Cap Take 1 capsule (0.4 mg total) by mouth daily. Take 1/2 hour following the same meal each day 90 capsule 3    Sildenafil Citrate 25 MG Oral Tab Take 1 tablet (25 mg total) by mouth daily as needed for Erectile Dysfunction. 8 tablet 5    amLODIPine 2.5 MG Oral Tab Take 1 tablet (2.5 mg total) by mouth daily.      Cholecalciferol 50 MCG (2000 UT) Oral Tab Vitamin D3 50 mcg (2,000 unit) tablet, [RxNorm: 231245]      clonazePAM 0.5 MG Oral Tab Take 1 tablet (0.5 mg total) by mouth 2 (two) times daily as needed. 60 tablet 0    Calcium Carbonate Antacid (TUMS OR) Take by mouth.      metoprolol tartrate 50 MG Oral Tab Take 1 tablet (50 mg total) by mouth 2x Daily(Beta Blocker). 60 tablet 3      Past Medical History:    CHF (congestive heart failure) (HCC)    Congestive heart disease (HCC)    High blood pressure      Social History:  Social History     Socioeconomic History    Marital status:    Tobacco Use    Smoking status: Every Day     Current packs/day: 0.50     Average packs/day: 0.5 packs/day for 30.0 years (15.0 ttl pk-yrs)     Types: Cigarettes     Passive exposure: Current    Smokeless tobacco: Never   Vaping Use    Vaping status: Never Used   Substance and Sexual Activity    Alcohol use: Not Currently     Comment: occasionally    Drug use: Never   Other Topics Concern    Caffeine Concern Yes     Comment: coffee, 3 cups     Social Drivers of Health     Food  Insecurity: No Food Insecurity (10/22/2023)    Food Insecurity     Food Insecurity: Never true   Transportation Needs: No Transportation Needs (10/22/2023)    Transportation Needs     Lack of Transportation: No      History reviewed. No pertinent surgical history.      EXAM:   There were no vitals taken for this visit.    System Details   Skin Inspection - Normal.   Constitutional Overall appearance - Normal.   Head/Face Symmetric, TMJ tenderness not present    Eyes EOMI, PERRL   Right ear:  Canal clear, TM intact, TIA   Left ear:  Canal with cerumen, TM intact, TIA   Nose: Septum midline, inferior turbinates not enlarged, nasal valves without collapse    Oral cavity/Oropharynx: No lesions or masses on inspection or palpation, tonsils symmetric    Neck: Soft without LAD, thyroid not enlarged  Voice clear/ no stridor   Other:      SCOPES AND PROCEDURES:     Canals:  Left: Canal with cerumen preventing adequate view of TM, debrided with instrumentation    Tympanic Membranes:  Left: Normal tympanic membrane.     TM Visualized Method:   Left TM examined via otomicroscopy.      PROCEDURE:   Removal of cerumen impaction   The cerumen impaction was completely removed on the left side using microscopy as necessary.   Removal was completed by using a curette and suction.     Procedures:  Myringotomy/Tympanostomy:  Pre-Procedure Care: Consent was obtained. Procedure/risks were explained. Questions were answered. Correct patient identified. Correct side and site confirmed.   A myringotomy was performed in the chris-inferior quadrant of the right tympanic membrane.  Anesthetic used was Phenol placed topically. Patient tolerated procedure well.    Procedures:  Myringotomy/Tympanostomy:  Pre-Procedure Care: Consent was obtained. Procedure/risks were explained. Questions were answered. Correct patient identified. Correct side and site confirmed.   A myringotomy was performed in the chris-inferior quadrant of the left tympanic  membrane.  Anesthetic used was Phenol placed topically. Patient tolerated procedure well.    AUDIOGRAM AND IMAGING:         IMPRESSION:   1. Eustachian tube disorder, unspecified laterality    2. Fluid level behind tympanic membrane of both ears    3. Impacted cerumen, left ear       Recommendations:  -Bilateral myringotomy performed today with large amount of serous effusion evacuated from each ear  -Discussed postprocedural care and return precautions.  -He will continue the Flonase and discussed the use of an oral decongestant as well  -Discussed the utility of ear tubes as if this continues to become a recurrent or chronic issue discussed the risk benefits alternatives of ear tubes    The patient indicates understanding of these issues and agrees to the plan.      Rickey Barnett MD  3/11/2025  10:28 AM

## 2025-03-14 ENCOUNTER — PROCEDURE (OUTPATIENT)
Dept: SURGERY | Facility: CLINIC | Age: 55
End: 2025-03-14
Payer: COMMERCIAL

## 2025-03-14 VITALS — SYSTOLIC BLOOD PRESSURE: 103 MMHG | DIASTOLIC BLOOD PRESSURE: 69 MMHG | HEART RATE: 64 BPM

## 2025-03-14 DIAGNOSIS — N40.1 BENIGN PROSTATIC HYPERPLASIA WITH WEAK URINARY STREAM: Primary | ICD-10-CM

## 2025-03-14 DIAGNOSIS — R39.12 BENIGN PROSTATIC HYPERPLASIA WITH WEAK URINARY STREAM: Primary | ICD-10-CM

## 2025-03-14 PROCEDURE — 99213 OFFICE O/P EST LOW 20 MIN: CPT | Performed by: UROLOGY

## 2025-03-14 PROCEDURE — 3079F DIAST BP 80-89 MM HG: CPT | Performed by: UROLOGY

## 2025-03-14 PROCEDURE — 3078F DIAST BP <80 MM HG: CPT | Performed by: UROLOGY

## 2025-03-14 PROCEDURE — 52000 CYSTOURETHROSCOPY: CPT | Performed by: UROLOGY

## 2025-03-14 PROCEDURE — 3074F SYST BP LT 130 MM HG: CPT | Performed by: UROLOGY

## 2025-03-14 RX ORDER — CIPROFLOXACIN 500 MG/1
500 TABLET, FILM COATED ORAL ONCE
Status: COMPLETED | OUTPATIENT
Start: 2025-03-14 | End: 2025-03-14

## 2025-03-14 RX ADMIN — CIPROFLOXACIN 500 MG: 500 TABLET, FILM COATED ORAL at 11:58:00

## 2025-03-14 NOTE — PROGRESS NOTES
Roberto Carrion is a 54 year old male.    HPI:     Chief Complaint   Patient presents with    BPH     Cystoscopy          54-year-old male presents for office cystoscopy.  He was seen for consultation February 20, 2024 for BPH symptoms, erectile dysfunction.  Started him on tamsulosin 0.4 mg daily.  He states he has been somewhat inconsistent as far as taking it every day and also taking it around the same time each day.  On days when he does take the medication states he feels better.  Remains on sildenafil 25 mg as needed for erectile dysfunction.      HISTORY:  Past Medical History:    CHF (congestive heart failure) (HCC)    Congestive heart disease (HCC)    High blood pressure      No past surgical history on file.   Family History   Problem Relation Age of Onset    Hypertension Mother       Social History:   Social History     Socioeconomic History    Marital status:    Tobacco Use    Smoking status: Every Day     Current packs/day: 0.50     Average packs/day: 0.5 packs/day for 30.0 years (15.0 ttl pk-yrs)     Types: Cigarettes     Passive exposure: Current    Smokeless tobacco: Never   Vaping Use    Vaping status: Never Used   Substance and Sexual Activity    Alcohol use: Not Currently     Comment: occasionally    Drug use: Never   Other Topics Concern    Caffeine Concern Yes     Comment: coffee, 3 cups     Social Drivers of Health     Food Insecurity: No Food Insecurity (10/22/2023)    Food Insecurity     Food Insecurity: Never true   Transportation Needs: No Transportation Needs (10/22/2023)    Transportation Needs     Lack of Transportation: No        Medications (Active prior to today's visit):  Current Outpatient Medications   Medication Sig Dispense Refill    fluticasone propionate 50 MCG/ACT Nasal Suspension 2 sprays by Each Nare route daily. 11.1 mL 0    tamsulosin 0.4 MG Oral Cap Take 1 capsule (0.4 mg total) by mouth daily. Take 1/2 hour following the same meal each day 90 capsule 3     Sildenafil Citrate 25 MG Oral Tab Take 1 tablet (25 mg total) by mouth daily as needed for Erectile Dysfunction. 8 tablet 5    amLODIPine 2.5 MG Oral Tab Take 1 tablet (2.5 mg total) by mouth daily.      Cholecalciferol 50 MCG (2000 UT) Oral Tab Vitamin D3 50 mcg (2,000 unit) tablet, [RxNorm: 965437]      clonazePAM 0.5 MG Oral Tab Take 1 tablet (0.5 mg total) by mouth 2 (two) times daily as needed. 60 tablet 0    Calcium Carbonate Antacid (TUMS OR) Take by mouth.      metoprolol tartrate 50 MG Oral Tab Take 1 tablet (50 mg total) by mouth 2x Daily(Beta Blocker). 60 tablet 3       Allergies:  Allergies[1]      ROS:       PHYSICAL EXAM:   Roberto Carrion  : 1970  Referring Physician: No ref. 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  Pictures taken and submitted into the chart. .    U.O's: Normal  Trabeculation: Grade 2      POST CYSTOSCOPY MEDICATIONS: sample one tablet Cipro 500mg given to patient    DIAGNOSIS:     PLAN: see below       ASSESSMENT/PLAN:   Assessment   Encounter Diagnosis   Name Primary?    Benign prostatic hyperplasia with weak urinary stream Yes       Reviewed findings at length with patient.  Recommended cystoscopy under anesthesia and biopsy of suspicious lesion.  I also discussed surgical options for management of his BPH including greenlight laser vaporization of his prostate.  Risks and possible side effects were reviewed with the patient.  He would like to consider those options for now and will call us back if he decides to proceed.  I stressed the importance of at least doing the biopsy to exclude urothelial cancer based on findings from today's cystoscopy and he understands this  clearly.           Orders This Visit:  No orders of the defined types were placed in this encounter.      Meds This Visit:  Requested Prescriptions     Pending Prescriptions Disp Refills    ciprofloxacin (Cipro) tab 500 mg         Imaging & Referrals:  None     3/14/2025  Alonzo Quigley MD               [1] No Known Allergies

## 2025-03-17 LAB — NON GYNE INTERPRETATION: NEGATIVE

## 2025-03-31 DIAGNOSIS — H93.8X3 CLOGGED EAR, BILATERAL: ICD-10-CM

## 2025-04-02 RX ORDER — FLUTICASONE PROPIONATE 50 MCG
2 SPRAY, SUSPENSION (ML) NASAL DAILY
Qty: 48 G | Refills: 3 | Status: SHIPPED | OUTPATIENT
Start: 2025-04-02 | End: 2026-03-28

## 2025-06-30 ENCOUNTER — TELEPHONE (OUTPATIENT)
Dept: INTERNAL MEDICINE CLINIC | Facility: CLINIC | Age: 55
End: 2025-06-30

## 2025-06-30 NOTE — TELEPHONE ENCOUNTER
[employer] message sent to patient regarding care gaps. Pt is due for Colorectal cancer screening .

## 2025-07-13 DIAGNOSIS — F41.1 GENERALIZED ANXIETY DISORDER: ICD-10-CM

## 2025-07-15 RX ORDER — CLONAZEPAM 0.5 MG/1
0.5 TABLET ORAL 2 TIMES DAILY PRN
Qty: 60 TABLET | Refills: 0 | Status: SHIPPED | OUTPATIENT
Start: 2025-07-15

## 2025-07-15 NOTE — TELEPHONE ENCOUNTER
For replies, please route to pool: [facility] CENTRAL REFILLS    Please review: medication fails/has no protocol attached.  No future appointments with primary care medicine  last office visit: 3/4/25    Recent fill dates: No dispense history in the last 6 months.  Date of  last written prescription: 11/26/24   Last prescribed quantity: #60 each with 0 additional refills

## (undated) DIAGNOSIS — E87.5 HYPERKALEMIA: Primary | ICD-10-CM

## (undated) NOTE — LETTER
AUTHORIZATION FOR SURGICAL OPERATION OR OTHER PROCEDURE    1. I hereby authorize Dr. Rickey Barnett, and [facility] staff assigned to my case to perform the following operation and/or procedure at the [facility] site:    _______________________________________________________________________________________________    Bilateral myringotomy   _______________________________________________________________________________________________    2.  My physician has explained the nature and purpose of the operation or other procedure, possible alternative methods of treatment, the risks involved, and the possibility of complication to me.  I acknowledge that no guarantee has been made as to the result that may be obtained.  3.  I recognize that, during the course of this operation, or other procedure, unforseen conditions may necessitate additional or different procedure than those listed above.  I, therefore, further authorize and request that the above named physician, his/her physician assistants or designees perform such procedures as are, in his/her professional opinion, necessary and desirable.  4.  Any tissue or organs removed in the operation or other procedure may be disposed of by and at the discretion of the [facility] and [facility].  5.  I understand that in the event of a medical emergency, I will be transported by local paramedics to [facility] or other hospital emergency department.  6.  I certify that I have read and fully understand the above consent to operation and/or other procedure.    7.  I acknowledge that my physician has explained sedation/analgesia administration to me including the risks and benefits.  I consent to the administration of sedation/analgesia as may be necessary or desirable in the judgement of my physician.    Witness signature: ___________________________________________________ Date:  ______/______/_____                     Time:  ________ A.M.  P.M.       Patient Name:  ______________________________________________________  (please print)      Patient signature:  ___________________________________________________             Relationship to Patient:           []  Parent    Responsible person                          []  Spouse  In case of minor or                    [] Other  _____________   Incompetent name:  __________________________________________________                               (please print)      _____________      Responsible person  In case of minor or  Incompetent signature:  _______________________________________________    Statement of Physician  My signature below affirms that prior to the time of the procedure, I have explained to the patient and/or his/her guardian, the risks and benefits involved in the proposed treatment and any reasonable alternative to the proposed treatment.  I have also explained the risks and benefits involved in the refusal of the proposed treatment and have answered the patient's questions.                        Date:  ______/______/_______  Provider                      Signature:  __________________________________________________________       Time:  ___________ A.M    P.M.

## (undated) NOTE — Clinical Note
CHERYL, TCM appt 11/8/2019. TCM template completed. Pt was not given any instructions on fluid restrictions. I instructed him to drink no more than 64 oz and to confirm that with you at tomorrows appt. He left [facility].

## (undated) NOTE — LETTER
No referring provider defined for this encounter. 12/26/19        Patient: Vince Stark   YOB: 1970   Date of Visit: 12/26/2019       Dear  Dr. Saul Yanes MD,      Thank you for referring Vince Stark to my practice.   April

## (undated) NOTE — LETTER
[address] [facility]    Consent for Diagnostic Services    Your physician has ordered one of the following tests to determine the function of your heart: Regadenoson Sestamibi. The information obtained will aid your physician in detecting the possible presence of heart disease, to determine why you may have such symptoms such as chest pain or shortness of breath and to determine an appropriate plan of medical management. The risks associated with any exercise test or pharmacological stress test are similar to the risks associated with exercise, including an abnormal blood pressure, dizziness, fainting, abnormal heart rate and in very rear instances heart attach, stroke, or death. During the test you must report any unusual feeling or symptoms you experience during the test. Every effort will be made to minimize such risks by careful observation during the test. Emergency equipment and trained personnel are available to deal with unusual situations, which may arise and these personnel have permission to treat you. During the treadmill or nuclear stress test, the exercise intensity begins at a low level and advances in stages depending on your fitness level. We may stop the test at any time because of signs of fatigue or changes in your heart rate, electrocardiogram, or blood pressure, or other symptoms you may experience. If your doctor has ordered a pharmacological stress test, you may experience a headache, shortness of breath, flushing, warmth, rapid heart rate, or a bitter taste in your mouth. Sometimes you may not experience any symptoms. Your prompt reporting of any symptoms is very important. During a Regadenoson stress test, you are given an injection of Regadenoson. Immediately after the Regadenoson is given, you will be injected with a radioactive isotope. During a Dobutamine stress test, Dobutamine infuses over approximately fifteen minutes.  A radioactive isotope is injected during the infusion. After either pharmacological stress test, you will have either a nuclear scan or an echocardiogram.    The information that is obtained during your testing will be treated as privileged and confidential. The information obtained will be given to your doctor and may be used for insurance purposes or to track and trend data as part of  with your privacy retained. I have read and understand the above information. I voluntarily agree and consent to the above ordered test. Furthermore, no guarantees or assurances have been given by anyone as to the results that may be obtained from this procedure. Any questions that may have occurred to me have been answered to my satisfaction.      Signature of Patient:   ____________________________________________________________    Signature of Witness: _______________________________Date: ___________Time: ________

## (undated) NOTE — LETTER
No referring provider defined for this encounter. 09/23/20        Patient: Will Jackson   YOB: 1970   Date of Visit: 9/23/2020       Dear  Dr. Kriss Sparrow MD,      Thank you for referring Will Jackson to my practice.   Please I therefore informed the patient that he does have chronic kidney disease stage III. This most likely secondary to hypertension nephrosclerosis. His blood pressures may be borderline high.   He was advised to check his blood pressures daily and call me in

## (undated) NOTE — LETTER
AUTHORIZATION FOR SURGICAL OPERATION OR OTHER PROCEDURE    1. I hereby authorize Rickey Henning, and [facility] staff assigned to my case to perform the following operation and/or procedure at the [facility]:    _______________________________________________________________________________________________    Bilateral Myringotomy   _______________________________________________________________________________________________    2.  My physician has explained the nature and purpose of the operation or other procedure, possible alternative methods of treatment, the risks involved, and the possibility of complication to me.  I acknowledge that no guarantee has been made as to the result that may be obtained.  3.  I recognize that, during the course of this operation, or other procedure, unforseen conditions may necessitate additional or different procedure than those listed above.  I, therefore, further authorize and request that the above named physician, his/her physician assistants or designees perform such procedures as are, in his/her professional opinion, necessary and desirable.  4.  Any tissue or organs removed in the operation or other procedure may be disposed of by and at the discretion of the [facility] and [facility].  5.  I understand that in the event of a medical emergency, I will be transported by local paramedics to [facility] or other hospital emergency department.  6.  I certify that I have read and fully understand the above consent to operation and/or other procedure.    7.  I acknowledge that my physician has explained sedation/analgesia administration to me including the risks and benefits.  I consent to the administration of sedation/analgesia as may be necessary or desirable in the judgement of my physician.    Witness signature: ___________________________________________________ Date:  ______/______/_____                    Time:   ________ A.M.  P.M.       Patient Name:  ______________________________________________________  (please print)      Patient signature:  ___________________________________________________             Relationship to Patient:           []  Parent    Responsible person                          []  Spouse  In case of minor or                    [] Other  _____________   Incompetent name:  __________________________________________________                               (please print)      _____________      Responsible person  In case of minor or  Incompetent signature:  _______________________________________________    Statement of Physician  My signature below affirms that prior to the time of the procedure, I have explained to the patient and/or his/her guardian, the risks and benefits involved in the proposed treatment and any reasonable alternative to the proposed treatment.  I have also explained the risks and benefits involved in the refusal of the proposed treatment and have answered the patient's questions.                        Date:  ______/______/_______  Provider                      Signature:  __________________________________________________________       Time:  ___________ A.M    P.M.

## (undated) NOTE — LETTER
08/16/21        Yaim[address]use 46      Dear Pablo Vendigi records indicate that you have outstanding lab work and or testing that was ordered for you and has not yet been completed:  Orders Placed This Encounter

## (undated) NOTE — LETTER
12/23/21        [address] [facility] [address]      Dear Aida Bowers records indicate that you have outstanding lab work and or testing that was ordered for you and has not yet been completed:  Orders Placed This Encounter

## (undated) NOTE — LETTER
No referring provider defined for this encounter. 12/06/19        Patient: Kev Tinajero   YOB: 1970   Date of Visit: 12/6/2019       Dear  Dr. Rodney Lebron MD,      Thank you for referring Kev Tinajero to my practice.   Please murmurs or rubs. Abdomen soft, flat, nontender without organomegaly, masses or bruits. Extremities revealed no edema. I reviewed his most recent laboratory studies which were done today on December 6, 2019.   Creatinine continues to improve down to 1.9

## (undated) NOTE — Clinical Note
FYI, TCM call made, see notes. NCM attempted to schedule a TCM appointment, patient is requesting a morning appointment, as he works in the afternoons. Patient is not able to do a virtual visit. Due to MD's full schedule message sent to MD's office for assistance scheduling the appointment.